# Patient Record
Sex: FEMALE | Race: WHITE | URBAN - METROPOLITAN AREA
[De-identification: names, ages, dates, MRNs, and addresses within clinical notes are randomized per-mention and may not be internally consistent; named-entity substitution may affect disease eponyms.]

---

## 2021-08-13 ENCOUNTER — APPOINTMENT (OUTPATIENT)
Dept: CT IMAGING | Age: 66
End: 2021-08-13
Payer: MEDICARE

## 2021-08-13 ENCOUNTER — APPOINTMENT (OUTPATIENT)
Dept: MRI IMAGING | Age: 66
End: 2021-08-13
Payer: MEDICARE

## 2021-08-13 ENCOUNTER — APPOINTMENT (OUTPATIENT)
Dept: GENERAL RADIOLOGY | Age: 66
End: 2021-08-13
Payer: MEDICARE

## 2021-08-13 ENCOUNTER — HOSPITAL ENCOUNTER (EMERGENCY)
Age: 66
Discharge: HOME | End: 2021-08-13
Attending: STUDENT IN AN ORGANIZED HEALTH CARE EDUCATION/TRAINING PROGRAM
Payer: MEDICARE

## 2021-08-13 VITALS
SYSTOLIC BLOOD PRESSURE: 143 MMHG | DIASTOLIC BLOOD PRESSURE: 89 MMHG | RESPIRATION RATE: 16 BRPM | HEART RATE: 61 BPM | HEIGHT: 72 IN | BODY MASS INDEX: 39.68 KG/M2 | WEIGHT: 293 LBS | TEMPERATURE: 98.5 F | OXYGEN SATURATION: 99 %

## 2021-08-13 DIAGNOSIS — M25.561 RIGHT KNEE PAIN, UNSPECIFIED CHRONICITY: ICD-10-CM
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
## 2021-08-13 DIAGNOSIS — M54.50 BILATERAL LOW BACK PAIN WITHOUT SCIATICA, UNSPECIFIED CHRONICITY: ICD-10-CM

## 2021-08-13 LAB
ANION GAP SERPL CALCULATED.3IONS-SCNC: 12 MMOL/L (ref 3–16)
ANION GAP SERPL CALCULATED.3IONS-SCNC: 8 MMOL/L (ref 3–16)
BASOPHILS ABSOLUTE: 0.1 K/UL (ref 0–0.2)
BASOPHILS RELATIVE PERCENT: 1.1 %
BILIRUBIN URINE: NEGATIVE
BLOOD, URINE: NEGATIVE
BUN BLDV-MCNC: 37 MG/DL (ref 7–20)
BUN BLDV-MCNC: 37 MG/DL (ref 7–20)
CALCIUM SERPL-MCNC: 8.9 MG/DL (ref 8.3–10.6)
CALCIUM SERPL-MCNC: 9.4 MG/DL (ref 8.3–10.6)
CHLORIDE BLD-SCNC: 101 MMOL/L (ref 99–110)
CHLORIDE BLD-SCNC: 103 MMOL/L (ref 99–110)
CLARITY: CLEAR
CO2: 21 MMOL/L (ref 21–32)
CO2: 23 MMOL/L (ref 21–32)
COLOR: YELLOW
CREAT SERPL-MCNC: 1.3 MG/DL (ref 0.6–1.2)
CREAT SERPL-MCNC: 1.3 MG/DL (ref 0.6–1.2)
EOSINOPHILS ABSOLUTE: 0.7 K/UL (ref 0–0.6)
EOSINOPHILS RELATIVE PERCENT: 8.5 %
GFR AFRICAN AMERICAN: 49
GFR AFRICAN AMERICAN: 49
GFR NON-AFRICAN AMERICAN: 41
GFR NON-AFRICAN AMERICAN: 41
GLUCOSE BLD-MCNC: 112 MG/DL (ref 70–99)
GLUCOSE BLD-MCNC: 98 MG/DL (ref 70–99)
GLUCOSE URINE: NEGATIVE MG/DL
HCT VFR BLD CALC: 35.5 % (ref 36–48)
HEMOGLOBIN: 11.7 G/DL (ref 12–16)
KETONES, URINE: NEGATIVE MG/DL
LEUKOCYTE ESTERASE, URINE: NEGATIVE
LYMPHOCYTES ABSOLUTE: 1.7 K/UL (ref 1–5.1)
LYMPHOCYTES RELATIVE PERCENT: 19.5 %
MCH RBC QN AUTO: 28.7 PG (ref 26–34)
MCHC RBC AUTO-ENTMCNC: 32.9 G/DL (ref 31–36)
MCV RBC AUTO: 87.3 FL (ref 80–100)
MICROSCOPIC EXAMINATION: NORMAL
MONOCYTES ABSOLUTE: 0.7 K/UL (ref 0–1.3)
MONOCYTES RELATIVE PERCENT: 8.4 %
NEUTROPHILS ABSOLUTE: 5.3 K/UL (ref 1.7–7.7)
NEUTROPHILS RELATIVE PERCENT: 62.5 %
NITRITE, URINE: NEGATIVE
PDW BLD-RTO: 14.5 % (ref 12.4–15.4)
PH UA: 6 (ref 5–8)
PLATELET # BLD: 307 K/UL (ref 135–450)
PMV BLD AUTO: 7.9 FL (ref 5–10.5)
POTASSIUM REFLEX MAGNESIUM: 5 MMOL/L (ref 3.5–5.1)
POTASSIUM SERPL-SCNC: 4.7 MMOL/L (ref 3.5–5.1)
PROTEIN UA: NEGATIVE MG/DL
RBC # BLD: 4.07 M/UL (ref 4–5.2)
SODIUM BLD-SCNC: 134 MMOL/L (ref 136–145)
SODIUM BLD-SCNC: 134 MMOL/L (ref 136–145)
SPECIFIC GRAVITY UA: 1.01 (ref 1–1.03)
URINE TYPE: NORMAL
UROBILINOGEN, URINE: 0.2 E.U./DL
WBC # BLD: 8.5 K/UL (ref 4–11)

## 2021-08-13 PROCEDURE — 70450 CT HEAD/BRAIN W/O DYE: CPT

## 2021-08-13 PROCEDURE — 96376 TX/PRO/DX INJ SAME DRUG ADON: CPT

## 2021-08-13 PROCEDURE — 96374 THER/PROPH/DIAG INJ IV PUSH: CPT

## 2021-08-13 PROCEDURE — 96375 TX/PRO/DX INJ NEW DRUG ADDON: CPT

## 2021-08-13 PROCEDURE — 81003 URINALYSIS AUTO W/O SCOPE: CPT

## 2021-08-13 PROCEDURE — 2580000003 HC RX 258: Performed by: NURSE PRACTITIONER

## 2021-08-13 PROCEDURE — 72146 MRI CHEST SPINE W/O DYE: CPT

## 2021-08-13 PROCEDURE — 72128 CT CHEST SPINE W/O DYE: CPT

## 2021-08-13 PROCEDURE — 6370000000 HC RX 637 (ALT 250 FOR IP): Performed by: PHYSICIAN ASSISTANT

## 2021-08-13 PROCEDURE — 73562 X-RAY EXAM OF KNEE 3: CPT

## 2021-08-13 PROCEDURE — 85025 COMPLETE CBC W/AUTO DIFF WBC: CPT

## 2021-08-13 PROCEDURE — 72131 CT LUMBAR SPINE W/O DYE: CPT

## 2021-08-13 PROCEDURE — 96361 HYDRATE IV INFUSION ADD-ON: CPT

## 2021-08-13 PROCEDURE — 72148 MRI LUMBAR SPINE W/O DYE: CPT

## 2021-08-13 PROCEDURE — 99285 EMERGENCY DEPT VISIT HI MDM: CPT

## 2021-08-13 PROCEDURE — 72125 CT NECK SPINE W/O DYE: CPT

## 2021-08-13 PROCEDURE — 6360000002 HC RX W HCPCS: Performed by: NURSE PRACTITIONER

## 2021-08-13 PROCEDURE — 80048 BASIC METABOLIC PNL TOTAL CA: CPT

## 2021-08-13 RX ORDER — OXYCODONE HYDROCHLORIDE 5 MG/1
10 TABLET ORAL ONCE
Status: COMPLETED | OUTPATIENT
Start: 2021-08-13 | End: 2021-08-13

## 2021-08-13 RX ORDER — SODIUM CHLORIDE, SODIUM LACTATE, POTASSIUM CHLORIDE, CALCIUM CHLORIDE 600; 310; 30; 20 MG/100ML; MG/100ML; MG/100ML; MG/100ML
1000 INJECTION, SOLUTION INTRAVENOUS ONCE
Status: COMPLETED | OUTPATIENT
Start: 2021-08-13 | End: 2021-08-13

## 2021-08-13 RX ORDER — OXYCODONE HYDROCHLORIDE 5 MG/1
5 TABLET ORAL EVERY 6 HOURS PRN
Qty: 12 TABLET | Refills: 0 | Status: SHIPPED | OUTPATIENT
Start: 2021-08-13 | End: 2021-08-16

## 2021-08-13 RX ORDER — ACETAMINOPHEN 325 MG/1
650 TABLET ORAL EVERY 6 HOURS PRN
Qty: 60 TABLET | Refills: 0 | Status: SHIPPED | OUTPATIENT
Start: 2021-08-13

## 2021-08-13 RX ORDER — ONDANSETRON 2 MG/ML
4 INJECTION INTRAMUSCULAR; INTRAVENOUS ONCE
Status: COMPLETED | OUTPATIENT
Start: 2021-08-13 | End: 2021-08-13

## 2021-08-13 RX ADMIN — HYDROMORPHONE HYDROCHLORIDE 1 MG: 1 INJECTION, SOLUTION INTRAMUSCULAR; INTRAVENOUS; SUBCUTANEOUS at 15:12

## 2021-08-13 RX ADMIN — HYDROMORPHONE HYDROCHLORIDE 1 MG: 1 INJECTION, SOLUTION INTRAMUSCULAR; INTRAVENOUS; SUBCUTANEOUS at 13:07

## 2021-08-13 RX ADMIN — OXYCODONE 10 MG: 5 TABLET ORAL at 19:15

## 2021-08-13 RX ADMIN — SODIUM CHLORIDE, POTASSIUM CHLORIDE, SODIUM LACTATE AND CALCIUM CHLORIDE 1000 ML: 600; 310; 30; 20 INJECTION, SOLUTION INTRAVENOUS at 15:07

## 2021-08-13 RX ADMIN — ONDANSETRON 4 MG: 2 INJECTION INTRAMUSCULAR; INTRAVENOUS at 13:08

## 2021-08-13 ASSESSMENT — ENCOUNTER SYMPTOMS
VOMITING: 0
ABDOMINAL PAIN: 0
NAUSEA: 0
RESPIRATORY NEGATIVE: 1
BACK PAIN: 1
EYES NEGATIVE: 1

## 2021-08-13 ASSESSMENT — PAIN SCALES - GENERAL
PAINLEVEL_OUTOF10: 8
PAINLEVEL_OUTOF10: 5
PAINLEVEL_OUTOF10: 4
PAINLEVEL_OUTOF10: 4
PAINLEVEL_OUTOF10: 5
PAINLEVEL_OUTOF10: 4

## 2021-08-13 ASSESSMENT — PAIN DESCRIPTION - LOCATION: LOCATION: HEAD

## 2021-08-13 ASSESSMENT — PAIN DESCRIPTION - PAIN TYPE: TYPE: ACUTE PAIN

## 2021-08-13 ASSESSMENT — PAIN DESCRIPTION - ORIENTATION: ORIENTATION: POSTERIOR

## 2021-08-13 NOTE — ED PROVIDER NOTES
[address] [facility] [address] ENCOUNTER          PHYSICIAN ASSISTANT NOTE     Date of evaluation: 8/13/2021    ADDENDUM:      Care of this patient was assumed from Anay Martins NP. The patient was seen for Fall (PT STATES HER LEGS \"JUST GAVE OUT\" AND SHE FELL GRASSY AREA), Back Pain (PT HAD SPINAL FUSION IN APRIL 2021 ), and Headache (PT STATES HE HIT HER HEAD ON GRASS WHEN SHE FELL, DENIES LOC BUT HAVING POSTERIOR HEAD PAN AND TAKE 81MG ASA WHICH SHE TOOK THIS AM )  . The patient's initial evaluation and plan have been discussed with the prior provider who initially evaluated the patient. Nursing Notes, Past Medical Hx, Past Surgical Hx, Social Hx, Allergies, and Family Hx were all reviewed. The patient was turned over into my care pending MRIs of the thoracic and lumbar spine as well as discussion with her surgeon at [address] [place] [address]. In summary this patient had surgery in April in which she has had a fusion from T9 through the sacrum. Today she was walking with her walker and states her legs gave out and she fell. She presented with worsening pain. She had some intermittent complaints of decreased sensation from the knee down with 3 out of 5 strength of the left lower extremity. Discussion with the patient she states she has had some decreased sensation that has progressively worsened since her surgery but she has not seen her surgeon for this. Diagnostic Results         RADIOLOGY:  CT LUMBAR SPINE WO CONTRAST   Final Result   1. Thoracic fusion hardware and screw alignment: Normal. No central canal or foraminal stenosis   2. L5-S1 right foraminal and subarticular stenosis   3. Suspect right foraminal stenosis and subarticular narrowing at L4-L5   4. Postoperative laminectomy with bony chip fragments noted right subarticular space, subarticular impingement not excluded. CT THORACIC SPINE WO CONTRAST   Final Result   1.  Thoracic fusion hardware and screw alignment: Normal. No central canal or foraminal stenosis   2. L5-S1 right foraminal and subarticular stenosis   3. Suspect right foraminal stenosis and subarticular narrowing at L4-L5   4. Postoperative laminectomy with bony chip fragments noted right subarticular space, subarticular impingement not excluded. XR KNEE RIGHT (3 VIEWS)   Final Result   1. Intact total knee prosthesis   2. Normal soft tissues      MRI THORACIC SPINE WO CONTRAST   Final Result   1. Normal thoracic cord. No cord compression Intact hardware     2. Degenerative Modic changes T9-T10   3. Limited visualization of lumbar central canal from L1 through L4   4. Postoperative subcutis fluid collection abutting the posterior spinous process, postoperative seroma measuring 11 x 3.7 x 2.3 cm. MRI LUMBAR SPINE WO CONTRAST   Final Result   1. Normal thoracic cord. No cord compression Intact hardware     2. Degenerative Modic changes T9-T10   3. Limited visualization of lumbar central canal from L1 through L4   4. Postoperative subcutis fluid collection abutting the posterior spinous process, postoperative seroma measuring 11 x 3.7 x 2.3 cm. CT HEAD WO CONTRAST   Final Result      1. NO ACUTE INTRACRANIAL ABNORMALITY. CT CERVICAL SPINE WO CONTRAST   Final Result   1. No acute traumatic abnormality of the cervical spine.              LABS:   Results for orders placed or performed during the hospital encounter of 08/13/21   CBC auto differential   Result Value Ref Range    WBC 8.5 4.0 - 11.0 K/uL    RBC 4.07 4.00 - 5.20 M/uL    Hemoglobin 11.7 (L) 12.0 - 16.0 g/dL    Hematocrit 35.5 (L) 36.0 - 48.0 %    MCV 87.3 80.0 - 100.0 fL    MCH 28.7 26.0 - 34.0 pg    MCHC 32.9 31.0 - 36.0 g/dL    RDW 14.5 12.4 - 15.4 %    Platelets 146 265 - 123 K/uL    MPV 7.9 5.0 - 10.5 fL    Neutrophils % 62.5 %    Lymphocytes % 19.5 %    Monocytes % 8.4 %    Eosinophils % 8.5 %    Basophils % 1.1 %    Neutrophils Absolute 5.3 1.7 - 7.7 K/uL    Lymphocytes Absolute 1.7 1.0 - 5.1 K/uL    Monocytes Absolute 0.7 0.0 - 1.3 K/uL    Eosinophils Absolute 0.7 (H) 0.0 - 0.6 K/uL    Basophils Absolute 0.1 0.0 - 0.2 K/uL   Basic Metabolic Panel   Result Value Ref Range    Sodium 134 (L) 136 - 145 mmol/L    Potassium 4.7 3.5 - 5.1 mmol/L    Chloride 101 99 - 110 mmol/L    CO2 21 21 - 32 mmol/L    Anion Gap 12 3 - 16    Glucose 112 (H) 70 - 99 mg/dL    BUN 37 (H) 7 - 20 mg/dL    CREATININE 1.3 (H) 0.6 - 1.2 mg/dL    GFR Non- 41 (A) >60    GFR  49 (A) >60    Calcium 9.4 8.3 - 10.6 mg/dL   Urinalysis, reflex to microscopic (Lab)   Result Value Ref Range    Color, UA Yellow Straw/Yellow    Clarity, UA Clear Clear    Glucose, Ur Negative Negative mg/dL    Bilirubin Urine Negative Negative    Ketones, Urine Negative Negative mg/dL    Specific Gravity, UA 1.010 1.005 - 1.030    Blood, Urine Negative Negative    pH, UA 6.0 5.0 - 8.0    Protein, UA Negative Negative mg/dL    Urobilinogen, Urine 0.2 <2.0 E.U./dL    Nitrite, Urine Negative Negative    Leukocyte Esterase, Urine Negative Negative    Microscopic Examination Not Indicated     Urine Type NotGiven    Basic Metabolic Panel w/ Reflex to MG   Result Value Ref Range    Sodium 134 (L) 136 - 145 mmol/L    Potassium reflex Magnesium 5.0 3.5 - 5.1 mmol/L    Chloride 103 99 - 110 mmol/L    CO2 23 21 - 32 mmol/L    Anion Gap 8 3 - 16    Glucose 98 70 - 99 mg/dL    BUN 37 (H) 7 - 20 mg/dL    CREATININE 1.3 (H) 0.6 - 1.2 mg/dL    GFR Non- 41 (A) >60    GFR  49 (A) >60    Calcium 8.9 8.3 - 10.6 mg/dL       RECENT VITALS:  BP: (!) 139/55, Temp: 98.5 °F (36.9 °C), Pulse: 68, Resp: 16, SpO2: 97 %     Procedures         ED Course     The patient was given the following medications:  Orders Placed This Encounter   Medications    HYDROmorphone (DILAUDID) injection 1 mg    ondansetron (ZOFRAN) injection 4 mg    lactated ringers infusion 1,000 mL    HYDROmorphone (DILAUDID) injection 1 mg    oxyCODONE (ROXICODONE) immediate release tablet 10 mg    oxyCODONE (ROXICODONE) 5 MG immediate release tablet     Sig: Take 1 tablet by mouth every 6 hours as needed for Pain for up to 3 days. WARNING: May cause drowsiness. May impair ability to operate a motor vehicle or machinery. Do not use in combination with alcohol. Dispense:  12 tablet     Refill:  0    acetaminophen (TYLENOL) 325 MG tablet     Sig: Take 2 tablets by mouth every 6 hours as needed for Pain     Dispense:  60 tablet     Refill:  0       CONSULTS:  IP CONSULT TO [address] [place] [address] / ASSESSMENT / Doc Bolus is a 76 y.o. female who presented to the emergency department after a fall causing worsening back pain after the patient had a fusion from T9 to the sacrum in April. Upon turned over into my care we were awaiting the results of her MRI. MRI showed normal thoracic cord with no cord compression and intact hardware. Degenerative Modic changes seen at T9-T10. There is limited visualization of the lumbar central canal from L1-L4. Also noted was postoperative subcutis fluid collection abutting the posterior spinous process consistent with a postoperative seroma. I spoke with the patient's neurosurgeon Dr. Kaia Catherine. He stated these are post op changes. He did recommend a CT of the thoracic and lumbar spine to better assess the hardware since the fall. She also had some right knee pain therefore I ordered an x-ray of her right knee. She was given 2 oxycodone here which she tolerated well. X-ray of the right knee shows intact total knee prosthesis with normal soft tissues. CT of the thoracic and lumbar spine shows thoracic fusion hardware and screw alignment which is normal with no central canal or foraminal stenosis. She has L5-S1 right foraminal and subarticular stenosis noted.   This shows suspect right foraminal stenosis and subarticular narrowing at L4/L5 with postoperative laminectomy bony chip fragments noted in the right subarticular space. I discussed the results of her CT scans with her neurosurgeon. He states this is nothing requiring emergent treatment and the hardware all is intact. While here the patient was able to ambulate using her walker with some mild pain however with a steady gait. In discussion with the patient and her daughter she feels comfortable to go home to follow-up with her neurosurgeon on Tuesday as scheduled. The patient has a scription for Percocet at home however I did offered to split up the oxycodone and Tylenol so that she is able to take something throughout the day. She is to discard the Percocet and begin the Tylenol I prescribed as well as the oxycodone for breakthrough pain. She is to return to the emergency department for worsening symptoms or concerns as discussed. This patient was also evaluated by the attending physician. All care plans were discussed and agreed upon. Clinical Impression     1. Fall, initial encounter    2. Bilateral low back pain without sciatica, unspecified chronicity    3. Right knee pain, unspecified chronicity        Disposition     PATIENT REFERRED TO:  Misty Victoria MD  [address] [address] [address] [place] [address]  [phone]      on Tuesday as scheduled      DISCHARGE MEDICATIONS:  New Prescriptions    ACETAMINOPHEN (TYLENOL) 325 MG TABLET    Take 2 tablets by mouth every 6 hours as needed for Pain    OXYCODONE (ROXICODONE) 5 MG IMMEDIATE RELEASE TABLET    Take 1 tablet by mouth every 6 hours as needed for Pain for up to 3 days. WARNING: May cause drowsiness. May impair ability to operate a motor vehicle or machinery. Do not use in combination with alcohol.        DISPOSITION Decision To Discharge 08/13/2021 10:24:41 PM       Kitty Craig  08/14/21 0101

## 2021-08-13 NOTE — ED PROVIDER NOTES
[address] [facility]  EMERGENCY DEPARTMENT ENCOUNTER          NURSE PRACTITIONER NOTE       Date of evaluation: 8/13/2021    Chief Complaint     Fall (PT STATES HER LEGS \"JUST GAVE OUT\" AND SHE FELL GRASSY AREA), Back Pain (PT HAD SPINAL FUSION IN APRIL 2021 ), and Headache (PT STATES HE HIT HER HEAD ON GRASS WHEN SHE FELL, DENIES LOC BUT HAVING POSTERIOR HEAD PAN AND TAKE 81MG ASA WHICH SHE TOOK THIS AM )      History of Present Illness     Griselda Burdick is a 76 y.o. female with a past medical history as noted below including recent L1-4 fusion and T9-pelvis fusion with Dr. Darell Galindo in April, 2021; who presents to the ED with a complaint of a fall. Patient states she was taking her daily walk with her walker outside that she has been doing since her spinal fusion in April 2021, states that today it felt like her legs gave out on her, cannot quantify if it was her right or her left leg; and she fell into the grass. States she did hit her head, did not lose consciousness. Had worsening back pain after this, complains of posterior head pain, and continued numbness and tingling of her left lower extremity. Patient cannot tell me how long the left lower extremity has been numb, so unsure if this is new or not. She does note that it was numb prior to her fall, has not followed up with her neurosurgeon/Dr. Darell Galindo regarding this numbness. Patient also shares that she has had \"clear drainage\" in her genital region that has caused a rash of her inner groin/redness and irritation of her inner groin. She has been treated antibiotics and now Diflucan by her PCP; however states that she has not been evaluated for this drainage. She was told to follow-up with gynecology she does not see until September. Patient unsure if this is urine, or drainage coming from her vagina. She denies any associated pain with this. States she does urinate normally, and denies any urinary symptoms. Denies any bowel incontinence. Prior to her fall today was having her normal back pain and otherwise states she was at her baseline. Review of Systems     Review of Systems   Constitutional: Negative. Eyes: Negative. Respiratory: Negative. Cardiovascular: Negative. Gastrointestinal: Negative for abdominal pain, nausea and vomiting. Genitourinary: Negative for dysuria, flank pain, frequency, hematuria, pelvic pain and urgency. \"clear drainage\"    Musculoskeletal: Positive for back pain, gait problem and neck pain. Skin: Negative. Allergic/Immunologic: Negative for immunocompromised state. Neurological: Positive for weakness (lower extremities) and numbness (left lower extremity). Hematological: Does not bruise/bleed easily. Psychiatric/Behavioral: Negative. Past Medical, Surgical, Family, and Social History     She has a past medical history of HTN (hypertension), Hyperlipidemia, Osteoarthritis, and Type II or unspecified type diabetes mellitus without mention of complication, not stated as uncontrolled. She has a past surgical history that includes joint replacement (4/03,05,07); Lumbar disc surgery; lumbar laminectomy; and Cholecystectomy. Her family history includes Heart Disease in her mother; High Blood Pressure in her mother; Other in her father. She reports that she has quit smoking. She quit after 25.00 years of use. She has never used smokeless tobacco. She reports previous alcohol use of about 1.0 standard drinks of alcohol per week. She reports that she does not use drugs. Medications     Previous Medications    AZITHROMYCIN (ZITHROMAX) 250 MG TABLET    Take 2 tabs (500 mg) on Day 1, and take 1 tab (250 mg) on days 2 through 5. DIPHENHYDRAMINE-APAP, SLEEP, (TYLENOL PM EXTRA STRENGTH)  MG TABS    Take  by mouth.       LISINOPRIL-HYDROCHLOROTHIAZIDE (PRINZIDE;ZESTORETIC) 10-12.5 MG PER TABLET    TAKE ONE TABLET BY MOUTH EVERY DAY    LISINOPRIL-HYDROCHLOROTHIAZIDE (PRINZIDE;ZESTORETIC) 10-12.5 MG PER TABLET    TAKE ONE TABLET BY MOUTH EVERY DAY    METFORMIN (GLUCOPHAGE) 500 MG TABLET    TAKE TWO TABLETS BY MOUTH TWICE DAILY WITH MEALS. MULTIPLE VITAMIN (MULTI-VITAMIN) TABS    Take  by mouth. NAPROXEN SODIUM (ALEVE) 220 MG TABLET    Take 220 mg by mouth 2 times daily (with meals). ONE TOUCH CLUB LANCETS MISC    One Touch Ultra 2  Test 2-3 times daily    ONE TOUCH ULTRA TEST STRIP    USE AS DIRECTED TO TEST 2-3 TIMES PER DAY       Allergies     She is allergic to pcn [penicillins]. Physical Exam     INITIAL VITALS: BP: (!) 163/72, Temp: 98.5 °F (36.9 °C), Pulse: 87, Resp: 16, SpO2: 98 %   Physical Exam  Vitals and nursing note reviewed. Constitutional:       General: She is in acute distress. Appearance: She is obese. Comments: Appears to be in pain, lying supine on gurney   Neck:      Comments: Tenderness palpation of C-spine midline  Cardiovascular:      Rate and Rhythm: Normal rate and regular rhythm. Pulses: Normal pulses. Heart sounds: Normal heart sounds. Pulmonary:      Effort: Pulmonary effort is normal. No respiratory distress. Breath sounds: Normal breath sounds. Abdominal:      General: Bowel sounds are normal. There is no distension. Tenderness: There is abdominal tenderness (Left lower quadrant). Musculoskeletal:      Comments: TTP of the c-spine, t-spine and l-spine midline and paraspinal musculature, post op scaring noted along lower thoracic and lumbar spine; no crepitus or deformity noted. Decreased sensation and strength in the left lower extremity. Skin:     General: Skin is warm and dry. Neurological:      Mental Status: She is alert and oriented to person, place, and time. Comments: Decreased sensation to sharp and dull sensation in the left lower extremity from the knee distally; strength 3-4/5 in the LLE with decreased dorsiflexion of the foot.   RLE 5/5, intact sensation and intact distal pulses. Psychiatric:         Mood and Affect: Mood normal.         Behavior: Behavior normal.           Diagnostic Results     RADIOLOGY:  CT HEAD WO CONTRAST   Final Result      1. NO ACUTE INTRACRANIAL ABNORMALITY. CT CERVICAL SPINE WO CONTRAST   Final Result   1. No acute traumatic abnormality of the cervical spine.          MRI THORACIC SPINE WO CONTRAST    (Results Pending)   MRI LUMBAR SPINE WO CONTRAST    (Results Pending)       LABS:   Results for orders placed or performed during the hospital encounter of 08/13/21   CBC auto differential   Result Value Ref Range    WBC 8.5 4.0 - 11.0 K/uL    RBC 4.07 4.00 - 5.20 M/uL    Hemoglobin 11.7 (L) 12.0 - 16.0 g/dL    Hematocrit 35.5 (L) 36.0 - 48.0 %    MCV 87.3 80.0 - 100.0 fL    MCH 28.7 26.0 - 34.0 pg    MCHC 32.9 31.0 - 36.0 g/dL    RDW 14.5 12.4 - 15.4 %    Platelets 421 348 - 728 K/uL    MPV 7.9 5.0 - 10.5 fL    Neutrophils % 62.5 %    Lymphocytes % 19.5 %    Monocytes % 8.4 %    Eosinophils % 8.5 %    Basophils % 1.1 %    Neutrophils Absolute 5.3 1.7 - 7.7 K/uL    Lymphocytes Absolute 1.7 1.0 - 5.1 K/uL    Monocytes Absolute 0.7 0.0 - 1.3 K/uL    Eosinophils Absolute 0.7 (H) 0.0 - 0.6 K/uL    Basophils Absolute 0.1 0.0 - 0.2 K/uL   Basic Metabolic Panel   Result Value Ref Range    Sodium 134 (L) 136 - 145 mmol/L    Potassium 4.7 3.5 - 5.1 mmol/L    Chloride 101 99 - 110 mmol/L    CO2 21 21 - 32 mmol/L    Anion Gap 12 3 - 16    Glucose 112 (H) 70 - 99 mg/dL    BUN 37 (H) 7 - 20 mg/dL    CREATININE 1.3 (H) 0.6 - 1.2 mg/dL    GFR Non- 41 (A) >60    GFR  49 (A) >60    Calcium 9.4 8.3 - 10.6 mg/dL   Urinalysis, reflex to microscopic (Lab)   Result Value Ref Range    Color, UA Yellow Straw/Yellow    Clarity, UA Clear Clear    Glucose, Ur Negative Negative mg/dL    Bilirubin Urine Negative Negative    Ketones, Urine Negative Negative mg/dL    Specific Gravity, UA 1.010 1.005 - 1.030    Blood, Urine Negative Negative pH, UA 6.0 5.0 - 8.0    Protein, UA Negative Negative mg/dL    Urobilinogen, Urine 0.2 <2.0 E.U./dL    Nitrite, Urine Negative Negative    Leukocyte Esterase, Urine Negative Negative    Microscopic Examination Not Indicated     Urine Type NotGiven        RECENT VITALS:  BP: (!) 151/56, Temp: 98.5 °F (36.9 °C), Pulse: 64, Resp: 16, SpO2: 98 %       ED Course     Nursing Notes, Past Medical Hx, Past Surgical Hx, Social Hx, Allergies, and Family Hx were reviewed. The patient was given the following medications:  Orders Placed This Encounter   Medications    HYDROmorphone (DILAUDID) injection 1 mg    ondansetron (ZOFRAN) injection 4 mg    lactated ringers infusion 1,000 mL    HYDROmorphone (DILAUDID) injection 1 mg            CONSULTS:  None    MEDICAL DECISION MAKING / ASSESSMENT / Jazmin Hill is a 76 y.o. female who presents with complaints as noted in HPI. Patient presents to the emergency department with a complaint of a fall prior to arrival landing in the grass with associated back pain, neck pain, headache. Patient with a recent spinal fusion, during examination is noted that she has decreased sensation and decreased strength in her left lower extremity, she does tell me that she has had some ongoing numbness and tingling in this leg cannot tell me if it is worse today or not, states \"well I have not felt it. \"  Has decreased dorsiflexion as well. Patient also has been having clear drainage concerning for possible urinary incontinence although does also urinate independently, no bowel incontinence. Given her weakness, and decreased sensation, concern for possible acute spinal abnormality.   Head CT, C-spine CT were obtained which are notably unremarkable for acute abnormality; and an MRI of the thoracic and lumbar spine were ordered for further evaluation of weakness and decreased sensation   Screening labs including CBC, EP 1, urinalysis were obtained; CBC without leukocytosis noted, stable H&H. Urinalysis without evidence of infection. BMP with a slight increase of creatinine to 1.3 from a baseline of 0.9, and BUN of 37. Patient given a liter of LR. No ketonuria noted. Patient was turned over primarily to my colleague for follow-up on MRI imaging results, discussion with neurosurgery, and ultimate disposition. This patient was also evaluated by the attending physician. All care plans were discussed and agreed upon. Clinical Impression     1. Fall, initial encounter    2. Bilateral low back pain without sciatica, unspecified chronicity    3.  Right knee pain, unspecified chronicity        Disposition       DISPOSITION pending        NIKKI Lawler - CNP  08/16/21 6715

## 2021-08-13 NOTE — ED NOTES
MRI check list completed      Mirian Mansfeild, [facility]  08/13/21 [id number]

## 2021-08-13 NOTE — ED NOTES
Bed: B24-24  Expected date:   Expected time:   Means of arrival:   Comments:  solange N[place] RN  08/13/21 0637

## 2021-08-19 ENCOUNTER — APPOINTMENT (OUTPATIENT)
Dept: CT IMAGING | Age: 66
DRG: 872 | End: 2021-08-19
Payer: MEDICARE

## 2021-08-19 ENCOUNTER — HOSPITAL ENCOUNTER (INPATIENT)
Age: 66
LOS: 4 days | Discharge: HOME | DRG: 872 | End: 2021-08-23
Attending: EMERGENCY MEDICINE | Admitting: INTERNAL MEDICINE
Payer: MEDICARE

## 2021-08-19 ENCOUNTER — APPOINTMENT (OUTPATIENT)
Dept: GENERAL RADIOLOGY | Age: 66
DRG: 872 | End: 2021-08-19
Payer: MEDICARE

## 2021-08-19 DIAGNOSIS — N17.9 AKI (ACUTE KIDNEY INJURY) (HCC): ICD-10-CM

## 2021-08-19 DIAGNOSIS — R50.9 FEVER, UNSPECIFIED FEVER CAUSE: Primary | ICD-10-CM

## 2021-08-19 DIAGNOSIS — D72.829 LEUKOCYTOSIS, UNSPECIFIED TYPE: ICD-10-CM

## 2021-08-19 PROBLEM — A41.9 SEPSIS (HCC): Status: ACTIVE | Noted: 2021-08-19

## 2021-08-19 LAB
ALBUMIN SERPL-MCNC: 3.6 G/DL (ref 3.4–5)
ALP BLD-CCNC: 109 U/L (ref 40–129)
ALT SERPL-CCNC: 13 U/L (ref 10–40)
ANION GAP SERPL CALCULATED.3IONS-SCNC: 16 MMOL/L (ref 3–16)
AST SERPL-CCNC: 26 U/L (ref 15–37)
BACTERIA: ABNORMAL /HPF
BASE EXCESS VENOUS: -2.1 MMOL/L (ref -2–3)
BASOPHILS ABSOLUTE: 0.1 K/UL (ref 0–0.2)
BASOPHILS RELATIVE PERCENT: 0.3 %
BILIRUB SERPL-MCNC: 0.7 MG/DL (ref 0–1)
BILIRUBIN DIRECT: <0.2 MG/DL (ref 0–0.3)
BILIRUBIN URINE: NEGATIVE
BILIRUBIN, INDIRECT: NORMAL MG/DL (ref 0–1)
BLOOD, URINE: ABNORMAL
BUN BLDV-MCNC: 30 MG/DL (ref 7–20)
CALCIUM SERPL-MCNC: 9 MG/DL (ref 8.3–10.6)
CARBOXYHEMOGLOBIN: 1.2 % (ref 0–1.5)
CHLORIDE BLD-SCNC: 98 MMOL/L (ref 99–110)
CLARITY: CLEAR
CO2: 17 MMOL/L (ref 21–32)
COLOR: YELLOW
CREAT SERPL-MCNC: 1.4 MG/DL (ref 0.6–1.2)
EOSINOPHILS ABSOLUTE: 0.1 K/UL (ref 0–0.6)
EOSINOPHILS RELATIVE PERCENT: 0.4 %
EPITHELIAL CELLS, UA: ABNORMAL /HPF (ref 0–5)
GFR AFRICAN AMERICAN: 45
GFR NON-AFRICAN AMERICAN: 38
GLUCOSE BLD-MCNC: 132 MG/DL (ref 70–99)
GLUCOSE BLD-MCNC: 147 MG/DL (ref 70–99)
GLUCOSE URINE: NEGATIVE MG/DL
HCO3 VENOUS: 21.7 MMOL/L (ref 24–28)
HCT VFR BLD CALC: 34.4 % (ref 36–48)
HEMOGLOBIN, VEN, REDUCED: 18.7 %
HEMOGLOBIN: 11.5 G/DL (ref 12–16)
KETONES, URINE: NEGATIVE MG/DL
LACTIC ACID, SEPSIS: 2.4 MMOL/L (ref 0.4–1.9)
LACTIC ACID: 1.7 MMOL/L (ref 0.4–2)
LEUKOCYTE ESTERASE, URINE: ABNORMAL
LYMPHOCYTES ABSOLUTE: 0.4 K/UL (ref 1–5.1)
LYMPHOCYTES RELATIVE PERCENT: 2.1 %
MCH RBC QN AUTO: 28.7 PG (ref 26–34)
MCHC RBC AUTO-ENTMCNC: 33.4 G/DL (ref 31–36)
MCV RBC AUTO: 86 FL (ref 80–100)
METHEMOGLOBIN VENOUS: 0.1 % (ref 0–1.5)
MICROSCOPIC EXAMINATION: YES
MONOCYTES ABSOLUTE: 1 K/UL (ref 0–1.3)
MONOCYTES RELATIVE PERCENT: 5.1 %
NEUTROPHILS ABSOLUTE: 18 K/UL (ref 1.7–7.7)
NEUTROPHILS RELATIVE PERCENT: 92.1 %
NITRITE, URINE: POSITIVE
O2 SAT, VEN: 81 %
PCO2, VEN: 33.5 MMHG (ref 41–51)
PDW BLD-RTO: 14.5 % (ref 12.4–15.4)
PERFORMED ON: ABNORMAL
PH UA: 6 (ref 5–8)
PH VENOUS: 7.42 (ref 7.35–7.45)
PLATELET # BLD: 290 K/UL (ref 135–450)
PMV BLD AUTO: 7.9 FL (ref 5–10.5)
PO2, VEN: 44.5 MMHG (ref 25–40)
POTASSIUM SERPL-SCNC: 4.7 MMOL/L (ref 3.5–5.1)
PRO-BNP: 1058 PG/ML (ref 0–124)
PROTEIN UA: 30 MG/DL
RAPID INFLUENZA  B AGN: NEGATIVE
RAPID INFLUENZA A AGN: NEGATIVE
RBC # BLD: 4 M/UL (ref 4–5.2)
RBC UA: ABNORMAL /HPF (ref 0–4)
SODIUM BLD-SCNC: 131 MMOL/L (ref 136–145)
SPECIFIC GRAVITY UA: 1.01 (ref 1–1.03)
TCO2 CALC VENOUS: 23 MMOL/L
TOTAL PROTEIN: 7.6 G/DL (ref 6.4–8.2)
TROPONIN: <0.01 NG/ML
URINE TYPE: ABNORMAL
UROBILINOGEN, URINE: 0.2 E.U./DL
WBC # BLD: 19.5 K/UL (ref 4–11)
WBC UA: ABNORMAL /HPF (ref 0–5)

## 2021-08-19 PROCEDURE — 83880 ASSAY OF NATRIURETIC PEPTIDE: CPT

## 2021-08-19 PROCEDURE — 96374 THER/PROPH/DIAG INJ IV PUSH: CPT

## 2021-08-19 PROCEDURE — 80076 HEPATIC FUNCTION PANEL: CPT

## 2021-08-19 PROCEDURE — 6360000004 HC RX CONTRAST MEDICATION: Performed by: STUDENT IN AN ORGANIZED HEALTH CARE EDUCATION/TRAINING PROGRAM

## 2021-08-19 PROCEDURE — U0005 INFEC AGEN DETEC AMPLI PROBE: HCPCS

## 2021-08-19 PROCEDURE — 36415 COLL VENOUS BLD VENIPUNCTURE: CPT

## 2021-08-19 PROCEDURE — 74177 CT ABD & PELVIS W/CONTRAST: CPT

## 2021-08-19 PROCEDURE — 84484 ASSAY OF TROPONIN QUANT: CPT

## 2021-08-19 PROCEDURE — 82803 BLOOD GASES ANY COMBINATION: CPT

## 2021-08-19 PROCEDURE — 87186 SC STD MICRODIL/AGAR DIL: CPT

## 2021-08-19 PROCEDURE — 1200000000 HC SEMI PRIVATE

## 2021-08-19 PROCEDURE — 87086 URINE CULTURE/COLONY COUNT: CPT

## 2021-08-19 PROCEDURE — 80048 BASIC METABOLIC PNL TOTAL CA: CPT

## 2021-08-19 PROCEDURE — 87150 DNA/RNA AMPLIFIED PROBE: CPT

## 2021-08-19 PROCEDURE — 6360000002 HC RX W HCPCS: Performed by: STUDENT IN AN ORGANIZED HEALTH CARE EDUCATION/TRAINING PROGRAM

## 2021-08-19 PROCEDURE — 93005 ELECTROCARDIOGRAM TRACING: CPT

## 2021-08-19 PROCEDURE — 83605 ASSAY OF LACTIC ACID: CPT

## 2021-08-19 PROCEDURE — U0003 INFECTIOUS AGENT DETECTION BY NUCLEIC ACID (DNA OR RNA); SEVERE ACUTE RESPIRATORY SYNDROME CORONAVIRUS 2 (SARS-COV-2) (CORONAVIRUS DISEASE [COVID-19]), AMPLIFIED PROBE TECHNIQUE, MAKING USE OF HIGH THROUGHPUT TECHNOLOGIES AS DESCRIBED BY CMS-2020-01-R: HCPCS

## 2021-08-19 PROCEDURE — 6370000000 HC RX 637 (ALT 250 FOR IP): Performed by: INTERNAL MEDICINE

## 2021-08-19 PROCEDURE — 2580000003 HC RX 258: Performed by: INTERNAL MEDICINE

## 2021-08-19 PROCEDURE — 87088 URINE BACTERIA CULTURE: CPT

## 2021-08-19 PROCEDURE — 85025 COMPLETE CBC W/AUTO DIFF WBC: CPT

## 2021-08-19 PROCEDURE — 87040 BLOOD CULTURE FOR BACTERIA: CPT

## 2021-08-19 PROCEDURE — 71045 X-RAY EXAM CHEST 1 VIEW: CPT

## 2021-08-19 PROCEDURE — 2580000003 HC RX 258: Performed by: STUDENT IN AN ORGANIZED HEALTH CARE EDUCATION/TRAINING PROGRAM

## 2021-08-19 PROCEDURE — 6360000002 HC RX W HCPCS: Performed by: INTERNAL MEDICINE

## 2021-08-19 PROCEDURE — 87804 INFLUENZA ASSAY W/OPTIC: CPT

## 2021-08-19 PROCEDURE — 81001 URINALYSIS AUTO W/SCOPE: CPT

## 2021-08-19 PROCEDURE — 6370000000 HC RX 637 (ALT 250 FOR IP): Performed by: STUDENT IN AN ORGANIZED HEALTH CARE EDUCATION/TRAINING PROGRAM

## 2021-08-19 PROCEDURE — 99285 EMERGENCY DEPT VISIT HI MDM: CPT

## 2021-08-19 RX ORDER — INSULIN LISPRO 100 [IU]/ML
0-6 INJECTION, SOLUTION INTRAVENOUS; SUBCUTANEOUS
Status: DISCONTINUED | OUTPATIENT
Start: 2021-08-20 | End: 2021-08-23 | Stop reason: HOSPADM

## 2021-08-19 RX ORDER — ONDANSETRON 2 MG/ML
4 INJECTION INTRAMUSCULAR; INTRAVENOUS EVERY 6 HOURS PRN
Status: DISCONTINUED | OUTPATIENT
Start: 2021-08-19 | End: 2021-08-23 | Stop reason: HOSPADM

## 2021-08-19 RX ORDER — INSULIN GLARGINE 100 [IU]/ML
40 INJECTION, SOLUTION SUBCUTANEOUS NIGHTLY
COMMUNITY

## 2021-08-19 RX ORDER — FUROSEMIDE 40 MG/1
20 TABLET ORAL DAILY
Status: ON HOLD | COMMUNITY
End: 2021-08-23 | Stop reason: SDUPTHER

## 2021-08-19 RX ORDER — ONDANSETRON 4 MG/1
4 TABLET, ORALLY DISINTEGRATING ORAL EVERY 8 HOURS PRN
Status: DISCONTINUED | OUTPATIENT
Start: 2021-08-19 | End: 2021-08-23 | Stop reason: HOSPADM

## 2021-08-19 RX ORDER — POLYETHYLENE GLYCOL 3350 17 G/17G
17 POWDER, FOR SOLUTION ORAL DAILY PRN
Status: DISCONTINUED | OUTPATIENT
Start: 2021-08-19 | End: 2021-08-23 | Stop reason: HOSPADM

## 2021-08-19 RX ORDER — SODIUM CHLORIDE 0.9 % (FLUSH) 0.9 %
5-40 SYRINGE (ML) INJECTION PRN
Status: DISCONTINUED | OUTPATIENT
Start: 2021-08-19 | End: 2021-08-23 | Stop reason: HOSPADM

## 2021-08-19 RX ORDER — ACETAMINOPHEN 650 MG/1
650 SUPPOSITORY RECTAL EVERY 6 HOURS PRN
Status: DISCONTINUED | OUTPATIENT
Start: 2021-08-19 | End: 2021-08-23 | Stop reason: HOSPADM

## 2021-08-19 RX ORDER — INSULIN LISPRO 100 [IU]/ML
0-3 INJECTION, SOLUTION INTRAVENOUS; SUBCUTANEOUS NIGHTLY
Status: DISCONTINUED | OUTPATIENT
Start: 2021-08-19 | End: 2021-08-23 | Stop reason: HOSPADM

## 2021-08-19 RX ORDER — ACETAMINOPHEN 325 MG/1
650 TABLET ORAL EVERY 6 HOURS PRN
Status: DISCONTINUED | OUTPATIENT
Start: 2021-08-19 | End: 2021-08-23 | Stop reason: HOSPADM

## 2021-08-19 RX ORDER — TRAMADOL HYDROCHLORIDE 50 MG/1
50 TABLET ORAL EVERY 6 HOURS PRN
Status: DISCONTINUED | OUTPATIENT
Start: 2021-08-19 | End: 2021-08-23 | Stop reason: HOSPADM

## 2021-08-19 RX ORDER — SODIUM CHLORIDE 9 MG/ML
INJECTION, SOLUTION INTRAVENOUS CONTINUOUS
Status: DISCONTINUED | OUTPATIENT
Start: 2021-08-19 | End: 2021-08-21

## 2021-08-19 RX ORDER — METHOCARBAMOL 750 MG/1
750 TABLET, FILM COATED ORAL 2 TIMES DAILY
Status: COMPLETED | OUTPATIENT
Start: 2021-08-19 | End: 2021-08-20

## 2021-08-19 RX ORDER — ASPIRIN 81 MG/1
81 TABLET ORAL DAILY
COMMUNITY

## 2021-08-19 RX ORDER — TRAMADOL HYDROCHLORIDE 50 MG/1
100 TABLET ORAL EVERY 6 HOURS PRN
Status: DISCONTINUED | OUTPATIENT
Start: 2021-08-19 | End: 2021-08-23 | Stop reason: HOSPADM

## 2021-08-19 RX ORDER — ACETAMINOPHEN 500 MG
1000 TABLET ORAL ONCE
Status: COMPLETED | OUTPATIENT
Start: 2021-08-19 | End: 2021-08-19

## 2021-08-19 RX ORDER — SODIUM CHLORIDE 9 MG/ML
25 INJECTION, SOLUTION INTRAVENOUS PRN
Status: DISCONTINUED | OUTPATIENT
Start: 2021-08-19 | End: 2021-08-23 | Stop reason: HOSPADM

## 2021-08-19 RX ORDER — METHOCARBAMOL 500 MG/1
500 TABLET, FILM COATED ORAL 4 TIMES DAILY PRN
COMMUNITY

## 2021-08-19 RX ORDER — SODIUM CHLORIDE 0.9 % (FLUSH) 0.9 %
5-40 SYRINGE (ML) INJECTION EVERY 12 HOURS SCHEDULED
Status: DISCONTINUED | OUTPATIENT
Start: 2021-08-19 | End: 2021-08-23 | Stop reason: HOSPADM

## 2021-08-19 RX ORDER — SODIUM CHLORIDE, SODIUM LACTATE, POTASSIUM CHLORIDE, CALCIUM CHLORIDE 600; 310; 30; 20 MG/100ML; MG/100ML; MG/100ML; MG/100ML
1000 INJECTION, SOLUTION INTRAVENOUS ONCE
Status: COMPLETED | OUTPATIENT
Start: 2021-08-19 | End: 2021-08-19

## 2021-08-19 RX ORDER — ONDANSETRON 2 MG/ML
4 INJECTION INTRAMUSCULAR; INTRAVENOUS ONCE
Status: COMPLETED | OUTPATIENT
Start: 2021-08-19 | End: 2021-08-19

## 2021-08-19 RX ORDER — TRAMADOL HYDROCHLORIDE 50 MG/1
50 TABLET ORAL EVERY 6 HOURS PRN
COMMUNITY

## 2021-08-19 RX ORDER — FLUCONAZOLE 150 MG/1
150 TABLET ORAL DAILY
Status: COMPLETED | OUTPATIENT
Start: 2021-08-20 | End: 2021-08-20

## 2021-08-19 RX ORDER — LISINOPRIL 20 MG/1
20 TABLET ORAL DAILY
COMMUNITY

## 2021-08-19 RX ADMIN — SODIUM CHLORIDE, POTASSIUM CHLORIDE, SODIUM LACTATE AND CALCIUM CHLORIDE 1000 ML: 600; 310; 30; 20 INJECTION, SOLUTION INTRAVENOUS at 12:51

## 2021-08-19 RX ADMIN — METHOCARBAMOL 750 MG: 750 TABLET ORAL at 21:47

## 2021-08-19 RX ADMIN — SODIUM CHLORIDE: 9 INJECTION, SOLUTION INTRAVENOUS at 23:38

## 2021-08-19 RX ADMIN — CEFEPIME HYDROCHLORIDE 2000 MG: 2 INJECTION, POWDER, FOR SOLUTION INTRAVENOUS at 21:34

## 2021-08-19 RX ADMIN — VANCOMYCIN HYDROCHLORIDE 2000 MG: 10 INJECTION, POWDER, LYOPHILIZED, FOR SOLUTION INTRAVENOUS at 23:41

## 2021-08-19 RX ADMIN — ONDANSETRON 4 MG: 2 INJECTION INTRAMUSCULAR; INTRAVENOUS at 21:47

## 2021-08-19 RX ADMIN — IOPAMIDOL 80 ML: 755 INJECTION, SOLUTION INTRAVENOUS at 13:32

## 2021-08-19 RX ADMIN — ACETAMINOPHEN 650 MG: 650 SUPPOSITORY RECTAL at 21:25

## 2021-08-19 RX ADMIN — ACETAMINOPHEN 1000 MG: 500 TABLET ORAL at 12:53

## 2021-08-19 RX ADMIN — ONDANSETRON 4 MG: 2 INJECTION INTRAMUSCULAR; INTRAVENOUS at 12:52

## 2021-08-19 RX ADMIN — Medication 10 ML: at 21:47

## 2021-08-19 RX ADMIN — ENOXAPARIN SODIUM 40 MG: 40 INJECTION SUBCUTANEOUS at 21:47

## 2021-08-19 ASSESSMENT — PAIN DESCRIPTION - PAIN TYPE: TYPE: ACUTE PAIN

## 2021-08-19 ASSESSMENT — ENCOUNTER SYMPTOMS
WHEEZING: 0
VOMITING: 0
ABDOMINAL PAIN: 0
NAUSEA: 1
CHEST TIGHTNESS: 0
SHORTNESS OF BREATH: 1
CONSTIPATION: 0
SINUS PRESSURE: 1
EYES NEGATIVE: 1

## 2021-08-19 ASSESSMENT — PAIN SCALES - GENERAL
PAINLEVEL_OUTOF10: 0
PAINLEVEL_OUTOF10: 6
PAINLEVEL_OUTOF10: 5
PAINLEVEL_OUTOF10: 0
PAINLEVEL_OUTOF10: 0
PAINLEVEL_OUTOF10: 6

## 2021-08-19 ASSESSMENT — PAIN DESCRIPTION - LOCATION
LOCATION: BACK
LOCATION: BACK

## 2021-08-19 ASSESSMENT — PAIN DESCRIPTION - ORIENTATION
ORIENTATION: RIGHT
ORIENTATION: LOWER;UPPER

## 2021-08-19 NOTE — ED TRIAGE NOTES
Pt states she was seen and treated in the hospital last week due to falling after her knee gave out, last night she started with chills, fever and sob. Pt is vaccinated.

## 2021-08-19 NOTE — ED PROVIDER NOTES
[facility] RESIDENT NOTE       Date of evaluation: 8/19/2021    Chief Complaint     Fever, Shortness of Breath, and Chills      History of Present Illness     Rachael Camargo is a 76 y.o. female who presents 2 days of feeling ill. The patient states that she has had chills, headache, sinus congestion, and a dry nonproductive cough. Notably the patient states that her granddaughter and daughter who live with her recently had cold-like symptoms. The patient states she is vaccinated against Covid. She endorses some shortness of breath, no chest pain, no abdominal pain. Unsure if she has had fever, but she does endorse having some chills. She also states that she has had intermittent nausea, and decreased p.o. intake. Review of Systems     Review of Systems   Constitutional: Positive for chills, fatigue and fever. Negative for activity change. HENT: Positive for congestion and sinus pressure. Eyes: Negative. Respiratory: Positive for shortness of breath. Negative for chest tightness and wheezing. Cardiovascular: Positive for palpitations and leg swelling. Negative for chest pain. Gastrointestinal: Positive for nausea. Negative for abdominal pain, constipation and vomiting. Genitourinary: Negative for decreased urine volume and dysuria. Musculoskeletal: Positive for myalgias. Neurological: Positive for weakness. Negative for dizziness, syncope and light-headedness. Psychiatric/Behavioral: Negative. Past Medical, Surgical, Family, and Social History     She has a past medical history of HTN (hypertension), Hyperlipidemia, Osteoarthritis, and Type II or unspecified type diabetes mellitus without mention of complication, not stated as uncontrolled. She has a past surgical history that includes joint replacement (4/03,05,07); Lumbar disc surgery; lumbar laminectomy; and Cholecystectomy.   Her family history includes Heart Disease in her mother; High Blood Pressure in her mother; Other in her father. She reports that she has quit smoking. She quit after 25.00 years of use. She has never used smokeless tobacco. She reports previous alcohol use of about 1.0 standard drinks of alcohol per week. She reports that she does not use drugs. Medications     Previous Medications    ACETAMINOPHEN (TYLENOL) 325 MG TABLET    Take 2 tablets by mouth every 6 hours as needed for Pain    ASPIRIN 81 MG EC TABLET    Take 81 mg by mouth daily    DIPHENHYDRAMINE-APAP, SLEEP, (TYLENOL PM EXTRA STRENGTH)  MG TABS    Take 1 tablet by mouth nightly as needed     FUROSEMIDE (LASIX) 40 MG TABLET    Take 20 mg by mouth daily     INSULIN GLARGINE (LANTUS) 100 UNIT/ML INJECTION VIAL    Inject 40 Units into the skin nightly     LISINOPRIL (PRINIVIL;ZESTRIL) 20 MG TABLET    Take 20 mg by mouth daily    METHOCARBAMOL (ROBAXIN) 500 MG TABLET    Take 500 mg by mouth 4 times daily as needed    ONE TOUCH CLUB LANCETS MISC    One Touch Ultra 2  Test 2-3 times daily    ONE TOUCH ULTRA TEST STRIP    USE AS DIRECTED TO TEST 2-3 TIMES PER DAY    TRAMADOL (ULTRAM) 50 MG TABLET    Take 50 mg by mouth every 6 hours as needed for Pain. Allergies     She is allergic to pcn [penicillins]. Physical Exam     INITIAL VITALS: BP: (!) 127/56, Temp: 102.9 °F (39.4 °C), Pulse: 106, Resp: 14, SpO2: 96 %   Physical Exam  Constitutional:       General: She is not in acute distress. Appearance: She is well-developed. She is obese. She is ill-appearing. She is not toxic-appearing. HENT:      Head: Normocephalic and atraumatic. Eyes:      Extraocular Movements: Extraocular movements intact. Pupils: Pupils are equal, round, and reactive to light. Cardiovascular:      Rate and Rhythm: Normal rate and regular rhythm. Pulmonary:      Effort: Pulmonary effort is normal.      Breath sounds: Normal breath sounds. Abdominal:      Palpations: Abdomen is soft. Monocytes % 5.1 %    Eosinophils % 0.4 %    Basophils % 0.3 %    Neutrophils Absolute 18.0 (H) 1.7 - 7.7 K/uL    Lymphocytes Absolute 0.4 (L) 1.0 - 5.1 K/uL    Monocytes Absolute 1.0 0.0 - 1.3 K/uL    Eosinophils Absolute 0.1 0.0 - 0.6 K/uL    Basophils Absolute 0.1 0.0 - 0.2 K/uL   Basic Metabolic Panel   Result Value Ref Range    Sodium 131 (L) 136 - 145 mmol/L    Potassium 4.7 3.5 - 5.1 mmol/L    Chloride 98 (L) 99 - 110 mmol/L    CO2 17 (L) 21 - 32 mmol/L    Anion Gap 16 3 - 16    Glucose 147 (H) 70 - 99 mg/dL    BUN 30 (H) 7 - 20 mg/dL    CREATININE 1.4 (H) 0.6 - 1.2 mg/dL    GFR Non- 38 (A) >60    GFR  45 (A) >60    Calcium 9.0 8.3 - 10.6 mg/dL   Lactate, Sepsis   Result Value Ref Range    Lactic Acid, Sepsis 2.4 (H) 0.4 - 1.9 mmol/L   Blood gas, venous (Lab)   Result Value Ref Range    pH, Aguilar 7.420 7.350 - 7.450    pCO2, Aguilar 33.5 (L) 41.0 - 51.0 mmHg    pO2, Aguilar 44.5 (H) 25 - 40 mmHg    HCO3, Venous 21.7 (L) 24.0 - 28.0 mmol/L    Base Excess, Aguilar -2.1 (L) -2.0 - 3.0 mmol/L    O2 Sat, Aguilar 81 Not established %    Carboxyhemoglobin 1.2 0.0 - 1.5 %    MetHgb, Aguilar 0.1 0.0 - 1.5 %    TC02 (Calc), Aguilar 23 mmol/L    Hemoglobin, Aguilar, Reduced 18.70 %   Brain Natriuretic Peptide   Result Value Ref Range    Pro-BNP 1,058 (H) 0 - 124 pg/mL   Troponin   Result Value Ref Range    Troponin <0.01 <0.01 ng/mL   Hepatic Function Panel   Result Value Ref Range    Total Protein 7.6 6.4 - 8.2 g/dL    Albumin 3.6 3.4 - 5.0 g/dL    Alkaline Phosphatase 109 40 - 129 U/L    ALT 13 10 - 40 U/L    AST 26 15 - 37 U/L    Total Bilirubin 0.7 0.0 - 1.0 mg/dL    Bilirubin, Direct <0.2 0.0 - 0.3 mg/dL    Bilirubin, Indirect see below 0.0 - 1.0 mg/dL   Lactate, plasma   Result Value Ref Range    Lactic Acid 1.7 0.4 - 2.0 mmol/L       ED BEDSIDE ULTRASOUND:  None    RECENT VITALS:  BP: (!) 124/50, Temp: 101.5 °F (38.6 °C), Pulse: 71,Resp: 15, SpO2: 95 %     Procedures     None    ED Course     Nursing Notes, Past Prescriptions    No medications on file       DISPOSITION Admitted 08/19/2021 02:37:56 PM     Arianna Lockett MD  Resident  08/19/21 [id number]

## 2021-08-19 NOTE — H&P
Hospital Medicine History & Physical      PCP: Misael Bautista    Date of Admission: 8/19/2021    Date of Service: Pt seen/examined on 8/19/2021 and Admitted to Inpatient with expected LOS greater than two midnights due to medical therapy. Chief Complaint:  Chills, fever       History Of Present Illness: The patient is a 76 y.o. female with medical history of HTN, DM type 2, OA, who presents to [facility] with fevers and chills that started this morning. Patient reports mild runny nose and dry cough that started yesterday. Some of her family members just got over URI type illness. This morning she developed chills and rigors to the point she was unable to use her phone. Also had dry heaving. Temp at home was 103 and she came to ER. Denies any emesis, diarrhea, abdominal pain, chest pain. Has acute on chronic back pain that has been present for few days since a fall. She was in ER on 8/13 and underwent evaluation with CT and MRI of thoracic and lumbar spine, which were negative for fracture, showed post operative seroma without evidence of infection. Imaging was done without contrast.     Past Medical History:        Diagnosis Date    HTN (hypertension) 3/15/2013    Hyperlipidemia 12/23/2011    Osteoarthritis     Type II or unspecified type diabetes mellitus without mention of complication, not stated as uncontrolled        Past Surgical History:        Procedure Laterality Date    CHOLECYSTECTOMY      JOINT REPLACEMENT  4/03,05,07    hips,knee    LUMBAR DISC SURGERY      LUMBAR LAMINECTOMY         Medications Prior to Admission:    Prior to Admission medications    Medication Sig Start Date End Date Taking?  Authorizing Provider   acetaminophen (TYLENOL) 325 MG tablet Take 2 tablets by mouth every 6 hours as needed for Pain 8/13/21   Tricia Aschoff, PA   lisinopril-hydrochlorothiazide (PRINZIDE;ZESTORETIC) 10-12.5 MG per tablet TAKE ONE TABLET BY MOUTH EVERY DAY 1/30/14   Robert Wayne Lawrence Lanes, MD   lisinopril-hydrochlorothiazide (PRINZIDE;ZESTORETIC) 10-12.5 MG per tablet TAKE ONE TABLET BY MOUTH EVERY DAY 12/26/13   Herbie Cooper MD   azithromycin (ZITHROMAX) 250 MG tablet Take 2 tabs (500 mg) on Day 1, and take 1 tab (250 mg) on days 2 through 5. 11/25/13   Herbie Cooper MD   ONE TOUCH ULTRA TEST strip USE AS DIRECTED TO TEST 2-3 TIMES PER DAY 7/29/13   Herbie Cooper MD   metformin (GLUCOPHAGE) 500 MG tablet TAKE TWO TABLETS BY MOUTH TWICE DAILY WITH MEALS. 4/12/13   Herbie Cooper MD   Multiple Vitamin (MULTI-VITAMIN) TABS Take  by mouth. Historical Provider, MD   Diphenhydramine-APAP, sleep, (TYLENOL PM EXTRA STRENGTH)  MG TABS Take  by mouth. Historical Provider, MD   naproxen sodium (ALEVE) 220 MG tablet Take 220 mg by mouth 2 times daily (with meals). Historical Provider, MD   ONE TOUCH CLUB LANCETS MISC One Touch Ultra 2  Test 2-3 times daily 6/13/11   Herbie Cooper MD       Allergies:  Pcn [penicillins]    Social History:  The patient currently lives at home    TOBACCO:   reports that she has quit smoking. She quit after 25.00 years of use. She has never used smokeless tobacco.  ETOH:   reports previous alcohol use of about 1.0 standard drinks of alcohol per week. Family History:  Reviewed in detail and Positive as follows:        Problem Relation Age of Onset    High Blood Pressure Mother     Heart Disease Mother         a fib   [place] Other Father         seizures       REVIEW OF SYSTEMS:   Positive and negative as noted in the HPI. All other systems reviewed and negative. PHYSICAL EXAM:    BP (!) 123/44   Pulse 76   Temp 101.5 °F (38.6 °C) (Oral)   Resp 13   Ht 6' (1.829 m)   Wt (!) 310 lb (140.6 kg)   SpO2 96%   BMI 42.04 kg/m²     General appearance: mild distress appears stated age and cooperative. HEENT Normal cephalic, atraumatic without obvious deformity. Conjunctivae/corneas clear.   Neck: Supple, No jugular venous distention/bruits. Trachea midline without thyromegaly or adenopathy with full range of motion. Lungs: Clear to auscultation, bilaterally without Rales/Wheezes/Rhonchi with good respiratory effort. Heart: Regular rate and rhythm with Normal S1/S2 without murmurs, rubs or gallops, point of maximum impulse non-displaced  Abdomen: Soft, non-tender or non-distended without rigidity or guarding and positive bowel sounds all four quadrants. Extremities: No clubbing, cyanosis, or edema bilaterally. Skin: Skin color, texture, turgor normal.  Scattered bruises on LE  Neurologic: Alert and oriented X 3, grossly non-focal.  Mental status: Alert, oriented, thought content appropriate. Capillary refill is brisk  Peripheral pulses 2+    CXR:  I have reviewed the CXR with the following interpretation: no infiltrate  EKG:  I have reviewed the EKG with the following interpretation: sinus with RBBB and left axis    CBC   Recent Labs     08/19/21  1223   WBC 19.5*   HGB 11.5*   HCT 34.4*         RENAL  Recent Labs     08/19/21  1223   *   K 4.7   CL 98*   CO2 17*   BUN 30*   CREATININE 1.4*     LFT'S  Recent Labs     08/19/21  1223   AST 26   ALT 13   BILIDIR <0.2   BILITOT 0.7   ALKPHOS 109     COAG  No results for input(s): INR in the last 72 hours. CARDIAC ENZYMES  Recent Labs     08/19/21  1223   TROPONINI <0.01       U/A:    Lab Results   Component Value Date    NITRITE neg 07/30/2011    COLORU Yellow 08/13/2021    CLARITYU Clear 08/13/2021    SPECGRAV 1.010 08/13/2021    LEUKOCYTESUR Negative 08/13/2021    BLOODU Negative 08/13/2021    GLUCOSEU Negative 08/13/2021       ABG  No results found for: GNL0LNK, BEART, T4QONYLM, PHART, THGBART, INM7AEY, PO2ART, SJL7ZHX        Active Hospital Problems    Diagnosis Date Noted    Sepsis ([facility] [address] [A41.9] 08/19/2021         ASSESSMENT/PLAN:    Sepsis:  Collect blood and urine cultures  CXR, CT A/P- non acute.  Rapid flu negative  Monitor back symptoms  Consult infectious disease  Start empiric antibiotics after cultures collected    SACHIN:  Likely prerenal losses due to fever. Hydrate with IVF, hold home lisinopril and diuretics    Vaginal candidiasis:  Cont with home diflucan. CT A/P non acute and symptoms are improving. HTN:  Hold ACEI until SACHIN resolves    Acute on chronic back pain:  Recent imaging from 8/13 was negative for infection. May need repeat imaging if continues to be febrile without source. DVT Prophylaxis: lovenox  Diet: No diet orders on file  Code Status: No Order  PT/OT Eval Status: ordered    Leonela Estrada MD    Thank you Watson Orozco for the opportunity to be involved in this patient's care. If you have any questions or concerns please feel free to contact me at [phone].

## 2021-08-19 NOTE — PROGRESS NOTES
List of Home Medications the patient is currently taking is complete. Home Medication list in EPIC updated to reflect changes noted below. Source of medications in list is patient interview, and [employer] prescription fill history. Medications added:   Lantus   Aspirin   Tramadol   Lisinopril   Furosemide   Methocarbamol    Medications removed:   Lisinopril-HCTZ   Metformin   Azithromycin   Naproxen   Multivitamin      No current facility-administered medications on file prior to encounter. Medication Sig    lisinopril (PRINIVIL;ZESTRIL) 20 MG tablet Take 20 mg by mouth daily    insulin glargine (LANTUS) 100 UNIT/ML injection vial Inject 40 Units into the skin nightly     furosemide (LASIX) 40 MG tablet Take 20 mg by mouth daily     traMADol (ULTRAM) 50 MG tablet Take 50 mg by mouth every 6 hours as needed for Pain.  aspirin 81 MG EC tablet Take 81 mg by mouth daily    acetaminophen (TYLENOL) 325 MG tablet Take 2 tablets by mouth every 6 hours as needed for Pain    Diphenhydramine-APAP, sleep, (TYLENOL PM EXTRA STRENGTH)  MG TABS Take  by mouth. Please call with questions.   Nancy Ag, PharmD, BCPS  Main pharmacy: [id number]  8/19/2021 6:54 PM

## 2021-08-20 LAB
ALBUMIN SERPL-MCNC: 2.9 G/DL (ref 3.4–5)
ANION GAP SERPL CALCULATED.3IONS-SCNC: 13 MMOL/L (ref 3–16)
BASOPHILS ABSOLUTE: 0.1 K/UL (ref 0–0.2)
BASOPHILS RELATIVE PERCENT: 0.3 %
BUN BLDV-MCNC: 35 MG/DL (ref 7–20)
CALCIUM SERPL-MCNC: 8.3 MG/DL (ref 8.3–10.6)
CHLORIDE BLD-SCNC: 99 MMOL/L (ref 99–110)
CO2: 21 MMOL/L (ref 21–32)
CREAT SERPL-MCNC: 1.9 MG/DL (ref 0.6–1.2)
EOSINOPHILS ABSOLUTE: 0 K/UL (ref 0–0.6)
EOSINOPHILS RELATIVE PERCENT: 0.1 %
GFR AFRICAN AMERICAN: 32
GFR NON-AFRICAN AMERICAN: 26
GLUCOSE BLD-MCNC: 138 MG/DL (ref 70–99)
GLUCOSE BLD-MCNC: 141 MG/DL (ref 70–99)
GLUCOSE BLD-MCNC: 154 MG/DL (ref 70–99)
GLUCOSE BLD-MCNC: 162 MG/DL (ref 70–99)
GLUCOSE BLD-MCNC: 163 MG/DL (ref 70–99)
HCT VFR BLD CALC: 29.3 % (ref 36–48)
HEMOGLOBIN: 9.7 G/DL (ref 12–16)
LYMPHOCYTES ABSOLUTE: 0.7 K/UL (ref 1–5.1)
LYMPHOCYTES RELATIVE PERCENT: 2.7 %
MCH RBC QN AUTO: 28.9 PG (ref 26–34)
MCHC RBC AUTO-ENTMCNC: 33.2 G/DL (ref 31–36)
MCV RBC AUTO: 86.9 FL (ref 80–100)
MONOCYTES ABSOLUTE: 1.6 K/UL (ref 0–1.3)
MONOCYTES RELATIVE PERCENT: 6.3 %
NEUTROPHILS ABSOLUTE: 23.1 K/UL (ref 1.7–7.7)
NEUTROPHILS RELATIVE PERCENT: 90.6 %
PDW BLD-RTO: 14.5 % (ref 12.4–15.4)
PERFORMED ON: ABNORMAL
PHOSPHORUS: 4.1 MG/DL (ref 2.5–4.9)
PLATELET # BLD: 219 K/UL (ref 135–450)
PMV BLD AUTO: 8.1 FL (ref 5–10.5)
POTASSIUM SERPL-SCNC: 4.6 MMOL/L (ref 3.5–5.1)
RBC # BLD: 3.37 M/UL (ref 4–5.2)
REPORT: NORMAL
SARS-COV-2: NOT DETECTED
SODIUM BLD-SCNC: 133 MMOL/L (ref 136–145)
VANCOMYCIN RANDOM: 24.8 UG/ML
WBC # BLD: 25.4 K/UL (ref 4–11)

## 2021-08-20 PROCEDURE — 6360000002 HC RX W HCPCS: Performed by: INTERNAL MEDICINE

## 2021-08-20 PROCEDURE — 80202 ASSAY OF VANCOMYCIN: CPT

## 2021-08-20 PROCEDURE — 2580000003 HC RX 258: Performed by: INTERNAL MEDICINE

## 2021-08-20 PROCEDURE — 80069 RENAL FUNCTION PANEL: CPT

## 2021-08-20 PROCEDURE — 85025 COMPLETE CBC W/AUTO DIFF WBC: CPT

## 2021-08-20 PROCEDURE — 99222 1ST HOSP IP/OBS MODERATE 55: CPT | Performed by: INTERNAL MEDICINE

## 2021-08-20 PROCEDURE — 2060000000 HC ICU INTERMEDIATE R&B

## 2021-08-20 PROCEDURE — 6370000000 HC RX 637 (ALT 250 FOR IP): Performed by: INTERNAL MEDICINE

## 2021-08-20 PROCEDURE — 36415 COLL VENOUS BLD VENIPUNCTURE: CPT

## 2021-08-20 RX ADMIN — ONDANSETRON 4 MG: 2 INJECTION INTRAMUSCULAR; INTRAVENOUS at 13:11

## 2021-08-20 RX ADMIN — FLUCONAZOLE 150 MG: 150 TABLET ORAL at 13:11

## 2021-08-20 RX ADMIN — INSULIN LISPRO 1 UNITS: 100 INJECTION, SOLUTION INTRAVENOUS; SUBCUTANEOUS at 17:40

## 2021-08-20 RX ADMIN — METHOCARBAMOL 750 MG: 750 TABLET ORAL at 19:32

## 2021-08-20 RX ADMIN — ACETAMINOPHEN 650 MG: 325 TABLET ORAL at 19:38

## 2021-08-20 RX ADMIN — ACETAMINOPHEN 650 MG: 325 TABLET ORAL at 11:23

## 2021-08-20 RX ADMIN — METHOCARBAMOL 750 MG: 750 TABLET ORAL at 09:08

## 2021-08-20 RX ADMIN — ENOXAPARIN SODIUM 40 MG: 40 INJECTION SUBCUTANEOUS at 17:40

## 2021-08-20 RX ADMIN — ONDANSETRON 4 MG: 2 INJECTION INTRAMUSCULAR; INTRAVENOUS at 19:29

## 2021-08-20 RX ADMIN — SODIUM CHLORIDE: 9 INJECTION, SOLUTION INTRAVENOUS at 22:12

## 2021-08-20 RX ADMIN — Medication 10 ML: at 19:32

## 2021-08-20 RX ADMIN — SODIUM CHLORIDE: 9 INJECTION, SOLUTION INTRAVENOUS at 13:11

## 2021-08-20 RX ADMIN — CEFEPIME HYDROCHLORIDE 2000 MG: 2 INJECTION, POWDER, FOR SOLUTION INTRAVENOUS at 09:08

## 2021-08-20 RX ADMIN — CEFTRIAXONE SODIUM 2000 MG: 2 INJECTION, POWDER, FOR SOLUTION INTRAMUSCULAR; INTRAVENOUS at 17:41

## 2021-08-20 ASSESSMENT — PAIN SCALES - GENERAL
PAINLEVEL_OUTOF10: 0
PAINLEVEL_OUTOF10: 4
PAINLEVEL_OUTOF10: 0

## 2021-08-20 ASSESSMENT — PAIN DESCRIPTION - PAIN TYPE
TYPE: ACUTE PAIN
TYPE: ACUTE PAIN

## 2021-08-20 ASSESSMENT — PAIN DESCRIPTION - LOCATION: LOCATION: HEAD

## 2021-08-20 NOTE — CONSULTS
Infectious Diseases Inpatient Consult Note    Reason for Consult:   E coli bacteremia  Requesting Physician:   Dr Ke Rojo  Primary Care Physician:  Cherie To  History Obtained From:   Pt, EPIC    Admit Date: 8/19/2021  Hospital Day: 2    CHIEF COMPLAINT:       Chief Complaint   Patient presents with    Fever    Shortness of Breath    Chills       HISTORY OF PRESENT ILLNESS:      77 yo woman with hx DM, HTN, OA    Presents with fever / chills, 'shakes'. Onset on morning that she came to hospital  Temp 103 at home  + sick contact, relatives with mild resp sx. In ED 8/19, T 102.9, WBC 19.5, UA neg, CXR neg  Admit, started on vancomycin (1 dose), cefepime. Today 8/20, afeb, WBC 25  Feels better - denies any resp / GI /  sx    SARS CoV-2 PCR neg  BC x 1 GNR, E coli by PCR    Past Medical History:    Past Medical History:   Diagnosis Date    HTN (hypertension) 3/15/2013    Hyperlipidemia 12/23/2011    Osteoarthritis     Type II or unspecified type diabetes mellitus without mention of complication, not stated as uncontrolled        Past Surgical History:    Past Surgical History:   Procedure Laterality Date    CHOLECYSTECTOMY      JOINT REPLACEMENT  4/03,05,07    hips,knee    LUMBAR DISC SURGERY      LUMBAR LAMINECTOMY         Current Medications:     cefepime  2,000 mg Intravenous Q12H    insulin lispro  0-6 Units Subcutaneous TID WC    insulin lispro  0-3 Units Subcutaneous Nightly    sodium chloride flush  5-40 mL Intravenous 2 times per day    enoxaparin  40 mg Subcutaneous Daily    methocarbamol  750 mg Oral BID       Allergies:  Pcn [penicillins]    Social History:    TOBACCO:    None - past cig use  ETOH:    None   DRUGS:   None   MARITAL STATUS:      OCCUPATION:   None     Family History:   No immunodeficiency    REVIEW OF SYSTEMS:    No fever / chills / sweats. No weight loss. No visual change, eye pain, eye discharge.     No oral lesion, sore throat, dysphagia. Denies cough / sputum. Denies chest pain, palpitations. Denies n / v / abd pain. No diarrhea. Denies dysuria or change in urinary function. Denies joint swelling or pain. No myalgia, arthralgia. Denies skin changes, itching  Denies focal weakness, sensory change or other neurologic symptom    Denies new / worse depression, psychiatric symptoms    PHYSICAL EXAM:      Vitals:    BP (!) 118/50   Pulse 69   Temp 98.6 °F (37 °C) (Oral)   Resp 18   Ht 6' (1.829 m)   Wt (!) 322 lb 12.1 oz (146.4 kg)   SpO2 96%   BMI 43.77 kg/m²     GENERAL: No apparent distress. HEENT: Membranes moist, no oral lesion, PERRL  NECK:  Supple, no lymphadenopathy  LUNGS: Clear b/l, no rales, no dullness  CARDIAC: RRR, no murmur appreciated  ABD:  + BS, soft / NT; no SP tenderness, CVAT  EXT:  No rash, no edema, no lesions  NEURO: No focal neurologic findings  PSYCH: Orientation, sensorium, mood normal  LINES:  Peripheral iv    DATA:    Lab Results   Component Value Date    WBC 25.4 (H) 2021    HGB 9.7 (L) 2021    HCT 29.3 (L) 2021    MCV 86.9 2021     2021     Lab Results   Component Value Date    CREATININE 1.9 (H) 2021    BUN 35 (H) 2021     (L) 2021    K 4.6 2021    CL 99 2021    CO2 21 2021       Hepatic Function Panel:   Lab Results   Component Value Date    ALKPHOS 109 2021    ALT 13 2021    AST 26 2021    PROT 7.6 2021    PROT 7.7 2012    BILITOT 0.7 2021    BILIDIR <0.2 2021    IBILI see below 2021    LABALBU 2.9 2021       Micro:   SARS CoV-2 PCR neg   BC x 1 GNR, E coli by PCR    Imagin/19 Abd / Pelvic CT  1. No acute abdominal or pelvic and normality identified. 2. Diverticulosis without diverticulitis. 3. Coronary artery calcification.       CXR  'no acute disease'      IMPRESSION:      Patient Active Problem List   Diagnosis    Venous insufficiency  Type II or unspecified type diabetes mellitus without mention of complication, not stated as uncontrolled    Hyperlipidemia    HTN (hypertension)    Hip dislocation, left (HCC)    Sepsis ([facility] [address]       Hx DM, HTN, OA    Fever  Leukocytosis  E coli bacteremia   - unclear source, UA neg, abd/ pelvic CT neg   - may be occult GI source, does have diverticulosis    RECOMMENDATIONS:    Change to ceftriaxone   Await E coli ID / sensitivities    If improved, WBC down, isolate sensitive to quinolones, can change to oral quinolone and treat for additional 10 days    Medical Decision Making:   The following items were considered in medical decision making:  Discussion of patient care with other providers  Reviewed clinical lab tests  Reviewed radiology tests  Reviewed other diagnostic tests/interventions  Independent review of radiologic images  Microbiology cultures and other micro tests reviewed      Discussed with pt  Jessica Gudino MD

## 2021-08-20 NOTE — PROGRESS NOTES
mildly toxic distress, appears stated age and cooperative. Lungs: Clear to ascultation, bilaterally without Rales/Wheezes/Rhonchi with good respiratory effort. Heart: Regular rate and rhythm with Normal S1/S2 without  murmurs, rubs or gallops, point of maximum impulse non-displaced  Abdomen: Soft, non-tender or non-distended without rigidity or guarding and positive bowel sounds all four quadrants. Extremities: No clubbing, cyanosis, or edema bilaterally. Skin: Skin color, texture, turgor normal.    Neurologic: Alert and oriented X 3,  grossly non-focal.  Mental status: Alert, oriented, thought content appropriate. Data    Recent Labs     08/19/21  1223 08/20/21  0425   WBC 19.5* 25.4*   HGB 11.5* 9.7*   HCT 34.4* 29.3*    219      Recent Labs     08/19/21  1223 08/20/21  0425   * 133*   K 4.7 4.6   CL 98* 99   CO2 17* 21   PHOS  --  4.1   BUN 30* 35*   CREATININE 1.4* 1.9*     Recent Labs     08/19/21  1223   AST 26   ALT 13   BILIDIR <0.2   BILITOT 0.7   ALKPHOS 109     No results for input(s): INR in the last 72 hours. Recent Labs     08/19/21  1223   TROPONINI <0.01       Consults:     IP CONSULT TO HOSPITALIST  IP CONSULT TO PHARMACY  IP CONSULT TO INFECTIOUS DISEASES    Active Hospital Problems    Diagnosis Date Noted    Sepsis ([facility] [address] [A41.9] 08/19/2021         ASSESSMENT AND PLAN      Sepsis:  Follow up on blood and urine cultures  CXR, CT A/P- non acute. Rapid flu negative. COVID 19 negative. Could be related to UTI- called lab and added culture  Cont with vanco and cefepime  ID consulted     SACHIN:  This is worsened. Likely prerenal losses due to fever/sepsis. Continue to [facility] with IVF, hold home lisinopril and diuretics  Consult nephrology     Vaginal candidiasis:  Cont with home diflucan. CT A/P non acute.     HTN:  Hold ACEI until SACHIN resolves     Acute on chronic back pain:  Recent imaging from 8/13 was negative for infection.  May need repeat imaging if continues to be febrile without source. DVT Prophylaxis: lovenox  Diet: ADULT DIET; Regular; 4 carb choices (60 gm/meal);  Low Fat/Low Chol/High Fiber/JAISON  Code Status: Full Code    PT/OT Mary Tran MD

## 2021-08-20 NOTE — PROGRESS NOTES
Clinical Pharmacy Progress Note    Vancomycin has been discontinued. Pharmacy will sign off. Please re-consult pharmacy if vancomycin dosing is wanted in the future. Please call with questions.   Shea Caballero PharmD, BCPS  Wireless: [phone]   8/20/2021 1:08 PM

## 2021-08-20 NOTE — PROGRESS NOTES
4 Eyes Admission Assessment     I agree as the admission nurse that 2 RN's have performed a thorough Head to Toe Skin Assessment on the patient. ALL assessment sites listed below have been assessed on admission. Areas assessed by both nurses:   [x]   Head, Face, and Ears   [x]   Shoulders, Back, and Chest  [x]   Arms, Elbows, and Hands   [x]   Coccyx, Sacrum, and Ischium  [x]   Legs, Feet, and Heels        Does the Patient have Skin Breakdown?   No         Mark Prevention initiated:  NA   Wound Care Orders initiated:  NA      WOC nurse consulted for Pressure Injury (Stage 3,4, Unstageable, DTI, NWPT, and Complex wounds) or Mark score 18 or lower:  NA      Nurse 1 eSignature: Electronically signed by Miri Lay RN on 8/20/21 at 4:32 AM EDT    **SHARE this note so that the co-signing nurse is able to place an eSignature**    Nurse 2 eSignature: Electronically signed by Mirian Ochoa RN on 8/20/21 at 4:21 AM EDT

## 2021-08-20 NOTE — CONSULTS
Clinical Pharmacy Consult Note    Admit date: 8/19/2021    Subjective/Objective:  76 y.o. female with medical history of HTN, DM type 2, OA, and vaginal candidiasis (on diflucan) who presented to ED with sepsis. Pharmacy is consulted to dose Vancomycin per Dr. Carol Quintana    Pertinent Medications:  Vancomycin -- pharmacy to dose -- day #1   2 g IV x1 (8/19)   1.5 g IV q24h (to start 8/20)    Recent Labs     08/19/21  1223   *   K 4.7   CL 98*   CO2 17*   BUN 30*   CREATININE 1.4*     Estimated Creatinine Clearance: 64 mL/min (A) (based on SCr of 1.4 mg/dL (H)). Recent Labs     08/19/21  1223   WBC 19.5*   HGB 11.5*   HCT 34.4*   MCV 86.0        Height:  6' (182.9 cm)  Weight: Weight: (!) 322 lb 12.1 oz (146.4 kg)    Micro:  8/19 Rapid flu = negative   8/19 Blood = sent    Assessment/Plan:  1. Sepsis: cefepime + vancomycin - day #1  Vancomycin  · Will give a 2 g IV x1 loading dose, then start 1500 mg IV q24h. Estimated AUC of 607 mg/L*hr.  · Patient is at risk for accumulation due to BMI. · Clinical pharmacist will follow-up in AM.  · Renal function will be monitored closely and dosing will be adjusted as appropriate. Please call with any questions. Thank you for consulting pharmacy!   Makenzie Lobo, PharmD  Main Pharmacy: [id number]  8/19/2021 8:26 PM

## 2021-08-20 NOTE — PROGRESS NOTES
Clinical Pharmacy Consult Note    Admit date: 8/19/2021    Subjective/Objective:  76 y.o. female with medical history of HTN, DM type 2, OA, and vaginal candidiasis (on diflucan) who presented to ED with sepsis. Pharmacy is consulted to dose Vancomycin per Dr. Erasmo Toribio    Pertinent Medications:  Vancomycin -- pharmacy to dose -- day #1   2 g IV x1 (8/19)   1.5 g IV q24h (to start 8/20)    Recent Labs     08/19/21  1223   *   K 4.7   CL 98*   CO2 17*   BUN 30*   CREATININE 1.4*     Estimated Creatinine Clearance: 64 mL/min (A) (based on SCr of 1.4 mg/dL (H)). Recent Labs     08/19/21  1223   WBC 19.5*   HGB 11.5*   HCT 34.4*   MCV 86.0        Height:  6' (182.9 cm)  Weight: Weight: (!) 322 lb 12.1 oz (146.4 kg)    Micro:  8/19 Rapid flu = negative   8/19 Blood = sent    Assessment/Plan:  1. Sepsis: cefepime + vancomycin - day #1  Vancomycin  · Will give a 2 g IV x1 loading dose, then start 1500 mg IV q24h. Estimated AUC of 607 mg/L*hr and trough of 15.8 mcg/mL. · Patient is at risk for accumulation due to BMI. · Clinical pharmacist will follow-up in AM.  · Renal function will be monitored closely and dosing will be adjusted as appropriate. Please call with any questions. Thank you for consulting pharmacy!   Francesca Land, PharmD  Main Pharmacy: [id number]  8/19/2021 8:57 PM

## 2021-08-20 NOTE — PROGRESS NOTES
Clinical Pharmacy Progress Note    ADMIT DATE: 8/19/2021     77 yo F with PMH of HTN, DM2, OA who presented 8/19 with fevers and chills. Admitted with sepsis and started on empiric antibiotics. Pharmacy has been consulted to dose vancomycin by Dr. Dolly Panda. PERTINENT MEDICATIONS:  Cefepime 2g IV q12h - day #2  Vancomycin - pharmacy to dose - day #2   8/19   Intermittent dosing (8/20-current)  Date Dose Vanc Level   8/20  Ordered               LABORATORY:  Recent Labs     08/19/21  1223 08/20/21  0425   * 133*   K 4.7 4.6   CL 98* 99   CO2 17* 21   BUN 30* 35*   CREATININE 1.4* 1.9*   GLUCOSE 147* 163*     Estimated Creatinine Clearance: 47 mL/min (A) (based on SCr of 1.9 mg/dL (H)). Recent Labs     08/19/21  1223 08/20/21  0425   WBC 19.5* 25.4*   HGB 11.5* 9.7*    219     MICROBIOLOGY:  Rapid influenza (8/19):  Negative  Blood (8/19): Sent x 2  Urine:  Sent    VITALS:  BP (!) 100/44   Pulse 68   Temp 99.1 °F (37.3 °C) (Oral)   Resp 18   Ht 6' (1.829 m)   Wt (!) 322 lb 12.1 oz (146.4 kg)   SpO2 100%   BMI 43.77 kg/m²     Intake/Output Summary (Last 24 hours) at 8/20/2021 1102  Last data filed at 8/19/2021 2346  Gross per 24 hour   Intake --   Output 800 ml   Net -800 ml     ASSESSMENT/PLAN:  1)  Sepsis: Cefepime + Vancomycin - Day #2  · Vancomycin--pharmacy to dose  · Pt admitted with SACHIN, SCr worsening today (1.4?1.9). · Will dose vancomycin based on intermittent levels for now. Have discontinued scheduled vancomycin and entered placeholder onto [facility] · Received 2g IV x1 overnight. · Random level ordered for today - will re-dose as appropriate. · Clinical status will be monitored closely / dose will be adjusted as appropriate. Please call with any questions. Tania FanD, BCPS  Wireless: [id number]   8/20/2021 11:02 AM    Addendum 8/20/21 12:45:  Vancomycin level ordered for 11:30 was actually added on to AM labs. Level = 24.8 mcg/mL, which was ~5h after previous dose. Will give 1g IV x1 this evening (timed ~24h after last dose). Random level ordered for tomorrow AM, Sat 8/21 -- will monitor renal function closely.      Corie Lemus PharmD, BCPS  Wireless: [id number]   8/20/2021 12:49 PM

## 2021-08-21 LAB
ALBUMIN SERPL-MCNC: 2.9 G/DL (ref 3.4–5)
ANION GAP SERPL CALCULATED.3IONS-SCNC: 13 MMOL/L (ref 3–16)
BASOPHILS ABSOLUTE: 0 K/UL (ref 0–0.2)
BASOPHILS RELATIVE PERCENT: 0.3 %
BUN BLDV-MCNC: 35 MG/DL (ref 7–20)
CALCIUM SERPL-MCNC: 8.2 MG/DL (ref 8.3–10.6)
CHLORIDE BLD-SCNC: 99 MMOL/L (ref 99–110)
CO2: 19 MMOL/L (ref 21–32)
CREAT SERPL-MCNC: 1.8 MG/DL (ref 0.6–1.2)
EOSINOPHILS ABSOLUTE: 0 K/UL (ref 0–0.6)
EOSINOPHILS RELATIVE PERCENT: 0.3 %
GFR AFRICAN AMERICAN: 34
GFR NON-AFRICAN AMERICAN: 28
GLUCOSE BLD-MCNC: 122 MG/DL (ref 70–99)
GLUCOSE BLD-MCNC: 128 MG/DL (ref 70–99)
GLUCOSE BLD-MCNC: 128 MG/DL (ref 70–99)
GLUCOSE BLD-MCNC: 129 MG/DL (ref 70–99)
GLUCOSE BLD-MCNC: 136 MG/DL (ref 70–99)
HCT VFR BLD CALC: 27.2 % (ref 36–48)
HEMOGLOBIN: 9.1 G/DL (ref 12–16)
LYMPHOCYTES ABSOLUTE: 0.4 K/UL (ref 1–5.1)
LYMPHOCYTES RELATIVE PERCENT: 2.6 %
MCH RBC QN AUTO: 28.6 PG (ref 26–34)
MCHC RBC AUTO-ENTMCNC: 33.4 G/DL (ref 31–36)
MCV RBC AUTO: 85.8 FL (ref 80–100)
MONOCYTES ABSOLUTE: 0.9 K/UL (ref 0–1.3)
MONOCYTES RELATIVE PERCENT: 6.1 %
NEUTROPHILS ABSOLUTE: 13.5 K/UL (ref 1.7–7.7)
NEUTROPHILS RELATIVE PERCENT: 90.7 %
PDW BLD-RTO: 14.7 % (ref 12.4–15.4)
PERFORMED ON: ABNORMAL
PHOSPHORUS: 3.7 MG/DL (ref 2.5–4.9)
PLATELET # BLD: 183 K/UL (ref 135–450)
PMV BLD AUTO: 8 FL (ref 5–10.5)
POTASSIUM SERPL-SCNC: 4.5 MMOL/L (ref 3.5–5.1)
RBC # BLD: 3.18 M/UL (ref 4–5.2)
SODIUM BLD-SCNC: 131 MMOL/L (ref 136–145)
WBC # BLD: 14.9 K/UL (ref 4–11)

## 2021-08-21 PROCEDURE — 6360000002 HC RX W HCPCS: Performed by: INTERNAL MEDICINE

## 2021-08-21 PROCEDURE — 2060000000 HC ICU INTERMEDIATE R&B

## 2021-08-21 PROCEDURE — 6370000000 HC RX 637 (ALT 250 FOR IP): Performed by: INTERNAL MEDICINE

## 2021-08-21 PROCEDURE — 2580000003 HC RX 258: Performed by: HOSPITALIST

## 2021-08-21 PROCEDURE — 99223 1ST HOSP IP/OBS HIGH 75: CPT | Performed by: HOSPITALIST

## 2021-08-21 PROCEDURE — 97116 GAIT TRAINING THERAPY: CPT

## 2021-08-21 PROCEDURE — 2580000003 HC RX 258: Performed by: INTERNAL MEDICINE

## 2021-08-21 PROCEDURE — 97161 PT EVAL LOW COMPLEX 20 MIN: CPT

## 2021-08-21 PROCEDURE — 85025 COMPLETE CBC W/AUTO DIFF WBC: CPT

## 2021-08-21 PROCEDURE — 36415 COLL VENOUS BLD VENIPUNCTURE: CPT

## 2021-08-21 PROCEDURE — 80069 RENAL FUNCTION PANEL: CPT

## 2021-08-21 PROCEDURE — 2500000003 HC RX 250 WO HCPCS: Performed by: HOSPITALIST

## 2021-08-21 RX ORDER — METHOCARBAMOL 750 MG/1
750 TABLET, FILM COATED ORAL EVERY 6 HOURS PRN
Status: DISCONTINUED | OUTPATIENT
Start: 2021-08-21 | End: 2021-08-23 | Stop reason: HOSPADM

## 2021-08-21 RX ORDER — PROCHLORPERAZINE EDISYLATE 5 MG/ML
10 INJECTION INTRAMUSCULAR; INTRAVENOUS ONCE
Status: COMPLETED | OUTPATIENT
Start: 2021-08-21 | End: 2021-08-21

## 2021-08-21 RX ADMIN — SODIUM CHLORIDE: 9 INJECTION, SOLUTION INTRAVENOUS at 06:19

## 2021-08-21 RX ADMIN — ONDANSETRON 4 MG: 2 INJECTION INTRAMUSCULAR; INTRAVENOUS at 18:54

## 2021-08-21 RX ADMIN — ACETAMINOPHEN 650 MG: 325 TABLET ORAL at 01:30

## 2021-08-21 RX ADMIN — METHOCARBAMOL 750 MG: 750 TABLET ORAL at 11:04

## 2021-08-21 RX ADMIN — SODIUM BICARBONATE: 84 INJECTION, SOLUTION INTRAVENOUS at 13:12

## 2021-08-21 RX ADMIN — ONDANSETRON 4 MG: 2 INJECTION INTRAMUSCULAR; INTRAVENOUS at 09:40

## 2021-08-21 RX ADMIN — ENOXAPARIN SODIUM 40 MG: 40 INJECTION SUBCUTANEOUS at 17:20

## 2021-08-21 RX ADMIN — MEROPENEM 1000 MG: 1 INJECTION, POWDER, FOR SOLUTION INTRAVENOUS at 17:23

## 2021-08-21 RX ADMIN — Medication 10 ML: at 20:20

## 2021-08-21 RX ADMIN — ONDANSETRON 4 MG: 2 INJECTION INTRAMUSCULAR; INTRAVENOUS at 01:26

## 2021-08-21 RX ADMIN — PROCHLORPERAZINE EDISYLATE 10 MG: 5 INJECTION INTRAMUSCULAR; INTRAVENOUS at 21:59

## 2021-08-21 RX ADMIN — MEROPENEM 1000 MG: 1 INJECTION, POWDER, FOR SOLUTION INTRAVENOUS at 11:02

## 2021-08-21 ASSESSMENT — PAIN SCALES - GENERAL
PAINLEVEL_OUTOF10: 0

## 2021-08-21 NOTE — PROGRESS NOTES
Pt febrile overnight. Tylenol PRN given per order. Pt also complained of nausea and had two episodes of vomiting during RN shift: relieved by PRN zofran. Will continue to monitor and assess pt needs.

## 2021-08-21 NOTE — PLAN OF CARE
Problem: Falls - Risk of:  Goal: Will remain free from falls  Description: Will remain free from falls  Outcome: Met This Shift     Problem: Falls - Risk of:  Goal: Absence of physical injury  Description: Absence of physical injury  Outcome: Met This Shift     Problem: Nutritional:  Goal: Nutritional status will improve  Description: Nutritional status will improve  Outcome: Met This Shift     Problem: Physical Regulation:  Goal: Diagnostic test results will improve  Description: Diagnostic test results will improve  Outcome: Met This Shift     Problem: Physical Regulation:  Goal: Will remain free from infection  Description: Will remain free from infection  Outcome: Met This Shift  Note: Temps this shift WNL's still c/o nausea , emesis, chills \"hot\" cold\" flashes     Problem: Physical Regulation:  Goal: Ability to maintain vital signs within normal range will improve  Description: Ability to maintain vital signs within normal range will improve  Outcome: Met This Shift     Problem: Physical Regulation:  Goal: Ability to maintain a body temperature in the normal range will improve  Description: Ability to maintain a body temperature in the normal range will improve  Outcome: Met This Shift     Problem: Respiratory:  Goal: Ability to maintain normal respiratory secretions will improve  Description: Ability to maintain normal respiratory secretions will improve  Outcome: Met This Shift     Problem: Skin Integrity:  Goal: Demonstration of wound healing without infection will improve  Description: Demonstration of wound healing without infection will improve  Outcome: Met This Shift     Problem: Skin Integrity:  Goal: Complications related to intravenous access or infusion will be avoided or minimized  Description: Complications related to intravenous access or infusion will be avoided or minimized  Outcome: Met This Shift     Problem: Fluid Volume:  Goal: Maintenance of adequate hydration will improve  Description: Maintenance of adequate hydration will improve  Outcome: Met This Shift  Note: On MIVF see I/O , pt missed saving urine x 2 , not taking po very well less nausea

## 2021-08-21 NOTE — PROGRESS NOTES
Physical Therapy    Facility/Department: [facility] [address] [facility]  Initial Assessment/ Treatment    NAME: Eloise Cade  : 1955  MRN: [id number]    Date of Service: 2021    Discharge Recommendations:  Eloise Cade scored a 18/24 on the AM-PAC short mobility form. Current research shows that an AM-PAC score of 18 or greater is typically associated with a discharge to the patient's home setting. Based on the patient's AM-PAC score and their current functional mobility deficits, it is recommended that the patient have 2-3 sessions per week of Physical Therapy at d/c to increase the patient's independence. At this time, this patient demonstrates the endurance and safety to discharge home with (home vs OP services) and a follow up treatment frequency of 2-3x/wk. Please see assessment section for further patient specific details. If patient discharges prior to next session this note will serve as a discharge summary. Please see below for the latest assessment towards goals. PT Equipment Recommendations  Equipment Needed: No (has RW at home)    Assessment   Body structures, Functions, Activity limitations: Decreased functional mobility ; Decreased endurance  Assessment: Pt is a 76 y.o female who presents to [facility] with fever, chills, and SOB. Pt performing functional mobility with SBA/CGA and use of RW. Pt demos decreased endurance d/t fatigue. Pt lives at home with her daughter and grand daughter and is able to recieve intermittent supervision from family. Pt typically uses RW and has one at home. Pt would continue to benefit from skilled PT to improve independence with functional mobility, LE strength, and functional endurance. Will follow.   Treatment Diagnosis: decreased functional mobility and endurance  Prognosis: Good  Decision Making: Low Complexity  PT Education: Goals;PT Role;Plan of Care;Transfer Training;Functional Mobility Training;Gait Training  Patient Education: pt verbalizes understanding  REQUIRES PT FOLLOW UP: Yes  Activity Tolerance  Activity Tolerance: Patient Tolerated treatment well;Patient limited by fatigue;Patient limited by endurance       Patient Diagnosis(es): The primary encounter diagnosis was Fever, unspecified fever cause. A diagnosis of Leukocytosis, unspecified type was also pertinent to this visit. has a past medical history of HTN (hypertension), Hyperlipidemia, Osteoarthritis, and Type II or unspecified type diabetes mellitus without mention of complication, not stated as uncontrolled. has a past surgical history that includes joint replacement (4/03,05,07); Lumbar disc surgery; lumbar laminectomy; and Cholecystectomy. Restrictions  Position Activity Restriction  Other position/activity restrictions: Up as tolerated  Vision/Hearing  Vision: Impaired  Vision Exceptions: Wears glasses for reading  Hearing: Within functional limits     Subjective  General  Chart Reviewed:  Yes  Additional Pertinent Hx: 76 y.o. female with medical history of HTN, DM type 2, OA, who presents to [facility] with fevers and chills  Response To Previous Treatment: Not applicable  Family / Caregiver Present: No  Referring Practitioner: Shaniqua Garcia MD  Referral Date : 08/20/21  Diagnosis: Sepsis  Follows Commands: Within Functional Limits  Subjective  Subjective: Pt supine in bed and agreeable to PT evaluation  Pain Screening  Patient Currently in Pain: Denies  Vital Signs  Patient Currently in Pain: Denies     Orientation  Orientation  Overall Orientation Status: Within Normal Limits  Social/Functional History  Social/Functional History  Lives With: Daughter (and grand daughter)  Type of Home: House  Home Layout: One level  Home Access: Level entry  Bathroom Shower/Tub: Tub/Shower unit  Bathroom Toilet: Standard  Bathroom Equipment: Grab bars in shower, Toilet raiser, Grab bars around toilet  Bathroom Accessibility: Ra Ruvalcaba: Jermain polanco, [address], [employer] Training, Neuromuscular Re-education, Safety Education & Training  Safety Devices  Type of devices:  All fall risk precautions in place, Bed alarm in place, Call light within reach, Left in bed, Nurse notified  Restraints  Initially in place: No     AM-PAC Score  AM-PAC Inpatient Mobility Raw Score : 18 (08/21/21 0843)  AM-PAC Inpatient T-Scale Score : 43.63 (08/21/21 0843)  Mobility Inpatient CMS 0-100% Score: 46.58 (08/21/21 0843)  Mobility Inpatient CMS G-Code Modifier : CK (08/21/21 0843)    Goals  Short term goals  Time Frame for Short term goals: d/c  Short term goal 1: bed mobility with supervision  Short term goal 2: transfers with supervision and LRAD  Short term goal 3: ambulate 150' with LRAD and supervision  Patient Goals   Patient goals : return home     Therapy Time   Individual Concurrent Group Co-treatment   Time In 0757         Time Out 0824         Minutes 27         Timed Code Treatment Minutes: 12 Minutes (+15 minute eval)     Jenn Prater, PT, DPT

## 2021-08-21 NOTE — PLAN OF CARE
Problem: Falls - Risk of:  Goal: Will remain free from falls  Description: Will remain free from falls  8/20/2021 2146 by Roberto Jones RN  Outcome: Ongoing  Note: Pt at risk for falls r/t weakness  Pt placed in fall risk precautions, bed alarm In place, bed in lowest locked position, call light in reach. RN encouraged pt to stay in bed and use call light to express needs.

## 2021-08-21 NOTE — PROGRESS NOTES
Hospitalist Progress Note      PCP: Vicente Mcgarry    Date of Admission: 2021    Chief Complaint on Admission: rigors    Pt Seen/Examined and Chart Reviewed. Admitting dx : sepsis    SUBJECTIVE/OBJECTIVE:   The patient is a 76 y.o. female with medical history of HTN, DM type 2, OA, who presents to [facility] with fevers and chills/rigors. She met sepsis criteria in ER. Admitted for sepsis work up. Found to have UTI and E Coli bacteremia. Continues to be febrile. Also having rigors and associated nausea and dry heaving. Moving her bowels well,. Urinating. Having more cough today. Tmax 103. Allergies  Pcn [penicillins]    Medications      Scheduled Meds:   meropenem  1,000 mg Intravenous Q8H    insulin lispro  0-6 Units Subcutaneous TID WC    insulin lispro  0-3 Units Subcutaneous Nightly    sodium chloride flush  5-40 mL Intravenous 2 times per day    enoxaparin  40 mg Subcutaneous Daily       Infusions:   sodium chloride         PRN Meds:  methocarbamol, sodium chloride flush, sodium chloride, ondansetron **OR** ondansetron, polyethylene glycol, acetaminophen **OR** acetaminophen, traMADol **OR** traMADol    Vitals    TEMPERATURE:  Current - Temp: 98.7 °F (37.1 °C); Max - Temp  Av.9 °F (37.7 °C)  Min: 98.3 °F (36.8 °C)  Max: 103 °F (39.4 °C)  RESPIRATIONS RANGE: Resp  Av.3  Min: 16  Max: 22  PULSE RANGE: Pulse  Av.3  Min: 69  Max: 89  BLOOD PRESSURE RANGE:  Systolic (00KUJ), FVD:133 , Min:118 , YRQ:171   ; Diastolic (18BRC), MMS:07, Min:50, Max:71    PULSE OXIMETRY RANGE: SpO2  Av %  Min: 92 %  Max: 99 %  24HR INTAKE/OUTPUT:      Intake/Output Summary (Last 24 hours) at 2021 1016  Last data filed at 2021 0547  Gross per 24 hour   Intake 5766.93 ml   Output 0 ml   Net 5766.93 ml       Exam:      General appearance: moderately toxic distress, appears stated age and cooperative.   Lungs: Clear to ascultation, bilaterally without Rales/Wheezes/Rhonchi with good respiratory effort. Heart: Regular rate and rhythm with Normal S1/S2 without  murmurs, rubs or gallops, point of maximum impulse non-displaced  Abdomen: Soft, non-tender or non-distended without rigidity or guarding and positive bowel sounds all four quadrants. Extremities: No clubbing, cyanosis, or edema bilaterally. Skin: Skin color, texture, turgor normal.    Neurologic: Alert and oriented X 3,  grossly non-focal.  Mental status: Alert, oriented, thought content appropriate. Data    Recent Labs     08/19/21  1223 08/20/21  0425 08/21/21  0517   WBC 19.5* 25.4* 14.9*   HGB 11.5* 9.7* 9.1*   HCT 34.4* 29.3* 27.2*    219 183      Recent Labs     08/19/21  1223 08/20/21  0425 08/21/21  0517   * 133* 131*   K 4.7 4.6 4.5   CL 98* 99 99   CO2 17* 21 19*   PHOS  --  4.1 3.7   BUN 30* 35* 35*   CREATININE 1.4* 1.9* 1.8*     Recent Labs     08/19/21  1223   AST 26   ALT 13   BILIDIR <0.2   BILITOT 0.7   ALKPHOS 109     No results for input(s): INR in the last 72 hours. Recent Labs     08/19/21  1223   TROPONINI <0.01       Consults:     IP CONSULT TO HOSPITALIST  IP CONSULT TO PHARMACY  IP CONSULT TO INFECTIOUS DISEASES  IP CONSULT TO NEPHROLOGY    Active Hospital Problems    Diagnosis Date Noted    [facility] [A41.9] 08/19/2021         ASSESSMENT AND PLAN      E. Coli bacteremia with UTI:  Follow up on blood and urine cultures sensitivities  CXR, CT A/P- non acute. Rapid flu negative. COVID 19 negative. ID following  Since patient had ongoing fevers post ceftriaxone- will change to meropenem until we have sensitivities      SACHIN:  Creat stabilized at 1.8. discussed with nephrology. Will continue to hold nephrotoxins and change to balanced IVF.      Vaginal candidiasis:  Finished course of diflucan. CT A/P non acute.     HTN:  Hold ACEI until SACHIN resolves     Acute on chronic back pain:  Recent imaging from 8/13 was negative for infection or other acute pathology.  Cont with tramadol and robaxin as needed. Back pain is stable. DVT Prophylaxis: lovenox  Diet: ADULT DIET; Regular; 4 carb choices (60 gm/meal);  Low Fat/Low Chol/High Fiber/JAISON  Code Status: Full Code    PT/OT Eval Status:18/24    Heather Ahmadi MD

## 2021-08-21 NOTE — CONSULTS
Infusions:   sodium chloride 125 mL/hr at 08/21/21 0619    sodium chloride         Labs:  CBC:   Recent Labs     08/19/21  1223 08/20/21  0425 08/21/21  0517   WBC 19.5* 25.4* 14.9*   HGB 11.5* 9.7* 9.1*    219 183     Ca/Mg/Phos:   Recent Labs     08/19/21  1223 08/20/21  0425 08/21/21  0517   CALCIUM 9.0 8.3 8.2*   PHOS  --  4.1 3.7     UA:  Recent Labs     08/19/21 1932   COLORU Yellow   CLARITYU Clear   GLUCOSEU Negative   BILIRUBINUR Negative   KETUA Negative   SPECGRAV 1.010   BLOODU SMALL*   PHUR 6.0   PROTEINU 30*   UROBILINOGEN 0.2   NITRU POSITIVE*   LEUKOCYTESUR MODERATE*   LABMICR YES   URINETYPE Voided      Urine Microscopic:   Recent Labs     08/19/21 1932   BACTERIA 2+*   WBCUA *   RBCUA 0-2   EPIU 0-1     Urine Chemistry: No results for input(s): CLUR, LABCREA, PROTEINUR, NAUR in the last 72 hours. ROS:     All systems reviewed and negative except as in HPI    Objective:     Vitals: /71   Pulse 74   Temp 98.3 °F (36.8 °C) (Oral)   Resp 22   Ht 6' (1.829 m)   Wt (!) 322 lb 12.1 oz (146.4 kg)   SpO2 99%   BMI 43.77 kg/m²    Wt Readings from Last 3 Encounters:   08/19/21 (!) 322 lb 12.1 oz (146.4 kg)   08/13/21 (!) 315 lb (142.9 kg)   07/29/13 (!) 326 lb 11.2 oz (148.2 kg)      24HR INTAKE/OUTPUT:      Intake/Output Summary (Last 24 hours) at 8/21/2021 0931  Last data filed at 8/21/2021 0830  Gross per 24 hour   Intake 4146.93 ml   Output 0 ml   Net 4146.93 ml     Constitutional:  awake, mild distress  HEENT:  MMM, No icterus  Neck: no bruits, No JVD  Cardiovascular:  reg rhythm  Respiratory: CTA, no crackles  Abdomen:  +BS, soft, NT, ND  Ext: no lower extremity edema  Psychiatric: mood and affect appropriate  Skin: dry/intact  CNS: alert, no agitation    IMAGING:  CT ABDOMEN PELVIS W IV CONTRAST Additional Contrast? None   Final Result      1. No acute abdominal or pelvic and normality identified. 2. Diverticulosis without diverticulitis.    3. Coronary artery calcification. XR CHEST PORTABLE   Final Result   1. No acute disease. Assessment :     1. SACHIN   -Non-Oliguric  -Baseline creat: <1 Now 1.9  -UA: pyuria  -abdominal CT scan: No hydronephrosis  -Volume: Euvolemic to mild hypovol  -Electrolytes: Hyponatremia  -Acid-Base: NAGMA and AGMA   -SACHIN in the setting of UTI. Home NSAID use    Recent Labs     08/19/21  1223 08/20/21  0425 08/21/21  0517   BUN 30* 35* 35*   CREATININE 1.4* 1.9* 1.8*     Recent Labs     08/19/21  1223 08/20/21  0425 08/21/21  0517   * 133* 131*   K 4.7 4.6 4.5   CO2 17* 21 19*         2. HTN  -Blood pressure at goal     BP Readings from Last 1 Encounters:   08/21/21 126/71       3. DM    4. UTI    Plan:     - IVF for hemodynamic support: change NS to sod bicarb  -Antimicrobials per primary team   - culture  - Hold Lisinopril  - Hold metformin  - Monitor BMP    -Monitor I/O, UOP  -Maintain MAP>65  -Avoid nephrotoxin, if able. -Dose meds to current eGFR    Spoke to Adrián Fuentes MD     Thank you for allowing us to participate in care of [place] . We will continue to follow. Feel free to contact me with any questions.       Philly Blum MD  8/21/2021    [facility] [address]  Office : [phone]  Fax :[phone]

## 2021-08-22 LAB
ALBUMIN SERPL-MCNC: 2.9 G/DL (ref 3.4–5)
ANION GAP SERPL CALCULATED.3IONS-SCNC: 16 MMOL/L (ref 3–16)
BASOPHILS ABSOLUTE: 0 K/UL (ref 0–0.2)
BASOPHILS RELATIVE PERCENT: 0.5 %
BUN BLDV-MCNC: 27 MG/DL (ref 7–20)
CALCIUM SERPL-MCNC: 8.5 MG/DL (ref 8.3–10.6)
CHLORIDE BLD-SCNC: 98 MMOL/L (ref 99–110)
CO2: 18 MMOL/L (ref 21–32)
CREAT SERPL-MCNC: 1.4 MG/DL (ref 0.6–1.2)
CULTURE, BLOOD 2: ABNORMAL
CULTURE, BLOOD 2: ABNORMAL
EOSINOPHILS ABSOLUTE: 0.3 K/UL (ref 0–0.6)
EOSINOPHILS RELATIVE PERCENT: 2.6 %
FOLATE: 11.25 NG/ML (ref 4.78–24.2)
GFR AFRICAN AMERICAN: 45
GFR NON-AFRICAN AMERICAN: 38
GLUCOSE BLD-MCNC: 122 MG/DL (ref 70–99)
GLUCOSE BLD-MCNC: 124 MG/DL (ref 70–99)
GLUCOSE BLD-MCNC: 139 MG/DL (ref 70–99)
GLUCOSE BLD-MCNC: 143 MG/DL (ref 70–99)
GLUCOSE BLD-MCNC: 166 MG/DL (ref 70–99)
HCT VFR BLD CALC: 27.4 % (ref 36–48)
HEMOGLOBIN: 9.3 G/DL (ref 12–16)
IRON SATURATION: 10 % (ref 15–50)
IRON: 14 UG/DL (ref 37–145)
LYMPHOCYTES ABSOLUTE: 0.5 K/UL (ref 1–5.1)
LYMPHOCYTES RELATIVE PERCENT: 5.4 %
MCH RBC QN AUTO: 28.8 PG (ref 26–34)
MCHC RBC AUTO-ENTMCNC: 33.8 G/DL (ref 31–36)
MCV RBC AUTO: 85.3 FL (ref 80–100)
MONOCYTES ABSOLUTE: 0.9 K/UL (ref 0–1.3)
MONOCYTES RELATIVE PERCENT: 9.2 %
NEUTROPHILS ABSOLUTE: 8.3 K/UL (ref 1.7–7.7)
NEUTROPHILS RELATIVE PERCENT: 82.3 %
ORGANISM: ABNORMAL
PDW BLD-RTO: 14.4 % (ref 12.4–15.4)
PERFORMED ON: ABNORMAL
PHOSPHORUS: 2.9 MG/DL (ref 2.5–4.9)
PLATELET # BLD: 178 K/UL (ref 135–450)
PMV BLD AUTO: 8.1 FL (ref 5–10.5)
POTASSIUM SERPL-SCNC: 4 MMOL/L (ref 3.5–5.1)
RBC # BLD: 3.22 M/UL (ref 4–5.2)
SODIUM BLD-SCNC: 132 MMOL/L (ref 136–145)
TOTAL IRON BINDING CAPACITY: 134 UG/DL (ref 260–445)
URINE CULTURE, ROUTINE: ABNORMAL
VITAMIN B-12: 211 PG/ML (ref 211–911)
WBC # BLD: 10.1 K/UL (ref 4–11)

## 2021-08-22 PROCEDURE — 83540 ASSAY OF IRON: CPT

## 2021-08-22 PROCEDURE — 2580000003 HC RX 258: Performed by: HOSPITALIST

## 2021-08-22 PROCEDURE — 99233 SBSQ HOSP IP/OBS HIGH 50: CPT | Performed by: HOSPITALIST

## 2021-08-22 PROCEDURE — 97535 SELF CARE MNGMENT TRAINING: CPT

## 2021-08-22 PROCEDURE — 80069 RENAL FUNCTION PANEL: CPT

## 2021-08-22 PROCEDURE — 6370000000 HC RX 637 (ALT 250 FOR IP): Performed by: INTERNAL MEDICINE

## 2021-08-22 PROCEDURE — 82607 VITAMIN B-12: CPT

## 2021-08-22 PROCEDURE — 2500000003 HC RX 250 WO HCPCS: Performed by: HOSPITALIST

## 2021-08-22 PROCEDURE — 2060000000 HC ICU INTERMEDIATE R&B

## 2021-08-22 PROCEDURE — 82746 ASSAY OF FOLIC ACID SERUM: CPT

## 2021-08-22 PROCEDURE — 97165 OT EVAL LOW COMPLEX 30 MIN: CPT

## 2021-08-22 PROCEDURE — 85025 COMPLETE CBC W/AUTO DIFF WBC: CPT

## 2021-08-22 PROCEDURE — 2580000003 HC RX 258: Performed by: INTERNAL MEDICINE

## 2021-08-22 PROCEDURE — 36415 COLL VENOUS BLD VENIPUNCTURE: CPT

## 2021-08-22 PROCEDURE — 6360000002 HC RX W HCPCS: Performed by: INTERNAL MEDICINE

## 2021-08-22 PROCEDURE — 83550 IRON BINDING TEST: CPT

## 2021-08-22 RX ORDER — FERROUS SULFATE 325(65) MG
325 TABLET ORAL
Status: DISCONTINUED | OUTPATIENT
Start: 2021-08-23 | End: 2021-08-23 | Stop reason: HOSPADM

## 2021-08-22 RX ADMIN — METHOCARBAMOL 750 MG: 750 TABLET ORAL at 22:01

## 2021-08-22 RX ADMIN — Medication 5 ML: at 22:01

## 2021-08-22 RX ADMIN — SODIUM BICARBONATE: 84 INJECTION, SOLUTION INTRAVENOUS at 03:43

## 2021-08-22 RX ADMIN — ENOXAPARIN SODIUM 40 MG: 40 INJECTION SUBCUTANEOUS at 17:29

## 2021-08-22 RX ADMIN — TRAMADOL HYDROCHLORIDE 50 MG: 50 TABLET, FILM COATED ORAL at 22:01

## 2021-08-22 RX ADMIN — INSULIN LISPRO 1 UNITS: 100 INJECTION, SOLUTION INTRAVENOUS; SUBCUTANEOUS at 12:59

## 2021-08-22 RX ADMIN — MEROPENEM 1000 MG: 1 INJECTION, POWDER, FOR SOLUTION INTRAVENOUS at 02:26

## 2021-08-22 RX ADMIN — INSULIN LISPRO 1 UNITS: 100 INJECTION, SOLUTION INTRAVENOUS; SUBCUTANEOUS at 17:25

## 2021-08-22 RX ADMIN — SODIUM BICARBONATE: 84 INJECTION, SOLUTION INTRAVENOUS at 14:01

## 2021-08-22 RX ADMIN — METHOCARBAMOL 750 MG: 750 TABLET ORAL at 13:03

## 2021-08-22 RX ADMIN — CEFTRIAXONE SODIUM 2000 MG: 2 INJECTION, POWDER, FOR SOLUTION INTRAMUSCULAR; INTRAVENOUS at 09:07

## 2021-08-22 RX ADMIN — INSULIN LISPRO 1 UNITS: 100 INJECTION, SOLUTION INTRAVENOUS; SUBCUTANEOUS at 22:01

## 2021-08-22 ASSESSMENT — PAIN SCALES - GENERAL
PAINLEVEL_OUTOF10: 0
PAINLEVEL_OUTOF10: 4
PAINLEVEL_OUTOF10: 0

## 2021-08-22 NOTE — PLAN OF CARE
Problem: Falls - Risk of:  Goal: Will remain free from falls  Description: Will remain free from falls  Outcome: Met This Shift  Note: Side rails up x 2 call light in reach bed in low position bed alarm on refusing to be up in chair, walks to  frequentlyhx recent back fusion , sitting very uncomfortable     Problem: Falls - Risk of:  Goal: Absence of physical injury  Description: Absence of physical injury  Outcome: Met This Shift     Problem: Nutritional:  Goal: Nutritional status will improve  Description: Nutritional status will improve  Outcome: Met This Shift  Note: No n/v today feels much better     Problem: Physical Regulation:  Goal: Diagnostic test results will improve  Description: Diagnostic test results will improve  Outcome: Met This Shift  Note: 99 temp this am 1st temp low grade in over 24 hrs     Problem: Physical Regulation:  Goal: Will remain free from infection  Description: Will remain free from infection  Outcome: Met This Shift     Problem: Physical Regulation:  Goal: Ability to maintain vital signs within normal range will improve  Description: Ability to maintain vital signs within normal range will improve  Outcome: Met This Shift     Problem: Physical Regulation:  Goal: Ability to maintain a body temperature in the normal range will improve  Description: Ability to maintain a body temperature in the normal range will improve  Outcome: Met This Shift     Problem: Respiratory:  Goal: Ability to maintain normal respiratory secretions will improve  Description: Ability to maintain normal respiratory secretions will improve  Outcome: Met This Shift  Note: Sats WNL's on R/A     Problem: Skin Integrity:  Goal: Demonstration of wound healing without infection will improve  Description: Demonstration of wound healing without infection will improve  Outcome: Met This Shift  Note: 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 knee     Problem: Skin Integrity:  Goal: Complications related to intravenous access or infusion will be avoided or minimized  Description: Complications related to intravenous access or infusion will be avoided or minimized  Outcome: Met This Shift     Problem: Fluid Volume:  Goal: Maintenance of adequate hydration will improve  Description: Maintenance of adequate hydration will improve  Outcome: Met This Shift  Note: MIVf cont.  Solution changed see MAR

## 2021-08-22 NOTE — PROGRESS NOTES
Hospitalist Progress Note      PCP: Tawanda Omalley    Date of Admission: 2021    Chief Complaint on Admission: rigors    Pt Seen/Examined and Chart Reviewed. Admitting dx : sepsis    SUBJECTIVE/OBJECTIVE:   The patient is a 76 y.o. female with medical history of HTN, DM type 2, OA, who presents to [facility] with fevers and chills/rigors. She met sepsis criteria in ER. Admitted for sepsis work up. Found to have UTI and E Coli bacteremia. Feels much better today. Was afebrile overnight. No emesis. Tolerating some PO. Having soft/loose stools without abdominal pain. D/w nephrology- plan to continue with IVF today. Allergies  Pcn [penicillins]    Medications      Scheduled Meds:   cefTRIAXone (ROCEPHIN) IV  2,000 mg Intravenous Q24H    insulin lispro  0-6 Units Subcutaneous TID WC    insulin lispro  0-3 Units Subcutaneous Nightly    sodium chloride flush  5-40 mL Intravenous 2 times per day    enoxaparin  40 mg Subcutaneous Daily       Infusions:   sodium bicarbonate infusion 75 mL/hr at 21 0343    sodium chloride         PRN Meds:  methocarbamol, sodium chloride flush, sodium chloride, ondansetron **OR** ondansetron, polyethylene glycol, acetaminophen **OR** acetaminophen, traMADol **OR** traMADol    Vitals    TEMPERATURE:  Current - Temp: 98.4 °F (36.9 °C);  Max - Temp  Av.5 °F (36.9 °C)  Min: 98.1 °F (36.7 °C)  Max: 98.9 °F (37.2 °C)  RESPIRATIONS RANGE: Resp  Av  Min: 18  Max: 18  PULSE RANGE: Pulse  Av  Min: 56  Max: 66  BLOOD PRESSURE RANGE:  Systolic (16SCA), GTC:540 , Min:128 , WZW:210   ; Diastolic (14BEA), OYS:36, Min:68, Max:81    PULSE OXIMETRY RANGE: SpO2  Av.5 %  Min: 95 %  Max: 98 %  24HR INTAKE/OUTPUT:      Intake/Output Summary (Last 24 hours) at 2021 1222  Last data filed at 2021 1132  Gross per 24 hour   Intake 1617.78 ml   Output 2150 ml   Net -532.22 ml       Exam:      General appearance: no acute distress, appears stated age and cooperative. Lungs: Clear to ascultation, bilaterally without Rales/Wheezes/Rhonchi with good respiratory effort. Heart: Regular rate and rhythm with Normal S1/S2 without  murmurs, rubs or gallops, point of maximum impulse non-displaced  Abdomen: Soft, non-tender or non-distended without rigidity or guarding and positive bowel sounds all four quadrants. Extremities: No clubbing, cyanosis, or edema bilaterally. Skin: Skin color, texture, turgor normal.    Neurologic: Alert and oriented X 3,  grossly non-focal.  Mental status: Alert, oriented, thought content appropriate. Data    Recent Labs     08/20/21  0425 08/21/21  0517 08/22/21  0522   WBC 25.4* 14.9* 10.1   HGB 9.7* 9.1* 9.3*   HCT 29.3* 27.2* 27.4*    183 178      Recent Labs     08/20/21  0425 08/21/21  0517 08/22/21  0522   * 131* 132*   K 4.6 4.5 4.0   CL 99 99 98*   CO2 21 19* 18*   PHOS 4.1 3.7 2.9   BUN 35* 35* 27*   CREATININE 1.9* 1.8* 1.4*     Recent Labs     08/19/21  1223   AST 26   ALT 13   BILIDIR <0.2   BILITOT 0.7   ALKPHOS 109     No results for input(s): INR in the last 72 hours. Recent Labs     08/19/21  1223   TROPONINI <0.01       Consults:     IP CONSULT TO HOSPITALIST  IP CONSULT TO PHARMACY  IP CONSULT TO INFECTIOUS DISEASES  IP CONSULT TO NEPHROLOGY    Active Hospital Problems    Diagnosis Date Noted    [facility] [A41.9] 08/19/2021         ASSESSMENT AND PLAN      E. Coli bacteremia with UTI:  Follow up on blood and urine cultures sensitivities- pan sensitive E. Coli  CXR, CT A/P- non acute. Rapid flu negative. COVID 19 negative. Change meropenem to ceftriaxone per sensitivities  ID following       SACHIN:  Creatinine started to improve. 1.3 today. D/w nephrology- continue with bicarb drip today     Vaginal candidiasis:  Finished course of diflucan.  CT A/P non acute.     HTN:  Hold ACEI until SACHIN resolves     Acute on chronic back pain:  Recent imaging from 8/13 was negative for infection or other acute pathology. Cont with tramadol and robaxin as needed. Back pain is stable. Loose stools:  Monitor. Will check C. Diff if meets criteria. Encourage probiotics (yogurt) with meals    Anemia:  Multifactorial. No gross bleeding. IVF/sepsis contributing. Fe low- start replacement. GI follow up as outpatient once recovered from sepsis. DVT Prophylaxis: lovenox  Diet: ADULT DIET; Regular; 4 carb choices (60 gm/meal);  Low Fat/Low Chol/High Fiber/JAISON  Code Status: Full Code    PT/OT Eval Status:18/24    Cassie Pierce MD

## 2021-08-22 NOTE — PROGRESS NOTES
Occupational Therapy   Occupational Therapy Initial Assessment/Treatment  Date: 2021   Patient Name: Shawna Nur  MRN: [id number]     : 1955    Date of Service: 2021    Discharge Recommendations:  Shawna Nur scored a 19/24 on the AM-PAC ADL Inpatient form. Current research shows that an AM-PAC score of 18 or greater is typically associated with a discharge to the patient's home setting. Based on the patient's AM-PAC score, and their current ADL deficits, it is recommended that the patient have 2-3 sessions per week of Occupational Therapy at d/c to increase the patient's independence. At this time, this patient demonstrates the endurance and safety to discharge home with home services and a follow up treatment frequency of 2-3x/wk. Please see assessment section for further patient specific details. If patient discharges prior to next session this note will serve as a discharge summary. Please see below for the latest assessment towards goals. OT Equipment Recommendations  Equipment Needed: No  Other: defer to next level of care    Assessment   Performance deficits / Impairments: Decreased functional mobility ; Decreased ADL status; Decreased strength;Decreased balance;Decreased endurance    Assessment: Pt is a 76 y.o F who presents below baseline for ADLs and functional mobility/transfers. Pt reports living at home w/ daughter and grand daughter who are able to provide intermittent assist as they are gone during the day at work and school. Pt reports that her sister works from home and plans to stay with the pt at the pt's house during the day. Pt was previously able to completed ADLs on her own with use of AE prn (i.e. sock aid and reacher for dressing) and was independent with functional mobility (w/ use of cane or RW) and transfers. Pt currently performing functional mobility with SBA/CGA and use of RW.  Pt also demo toileting and toilet transfer w/ SBA and CGA respectively. Pt would benefit from cont OT services to maximize safety and increase functional independence. Treatment Diagnosis: impaired ADLs and functional mobility/transfers  Prognosis: Good  Decision Making: Low Complexity  OT Education: OT Role;Plan of Care;Transfer Training  Patient Education: pt verb understanding  REQUIRES OT FOLLOW UP: Yes  Activity Tolerance  Activity Tolerance: Patient Tolerated treatment well  Safety Devices  Safety Devices in place: Yes  Type of devices: Left in bed;Nurse notified;Call light within reach; Bed alarm in place           Patient Diagnosis(es): The primary encounter diagnosis was Fever, unspecified fever cause. A diagnosis of Leukocytosis, unspecified type was also pertinent to this visit. has a past medical history of HTN (hypertension), Hyperlipidemia, Osteoarthritis, and Type II or unspecified type diabetes mellitus without mention of complication, not stated as uncontrolled. has a past surgical history that includes joint replacement (4/03,05,07); Lumbar disc surgery; lumbar laminectomy; and Cholecystectomy. Treatment Diagnosis: impaired ADLs and functional mobility/transfers      Restrictions  Position Activity Restriction  Other position/activity restrictions: Up as tolerated    Subjective   General  Chart Reviewed: Yes  Patient assessed for rehabilitation services?: Yes  Additional Pertinent Hx: 76 y.o. female with medical history of HTN, DM type 2, OA, who presents to [facility] with fevers and chills  Family / Caregiver Present: No  Referring Practitioner: Dinora Vásquez MD  Diagnosis: Sepsis  Subjective  Subjective: Pt supine in bed upon arrival and agreeable to OT eval/treat.   Patient Currently in Pain: Denies  Pain Assessment  Pain Assessment: 0-10  Pain Level: 0  Vital Signs  Temp: 99 °F (37.2 °C)  Temp Source: Oral  Pulse: 64  Heart Rate Source: Monitor;Telemetry  Resp: 16  BP: (!) 147/80  BP Location: Left lower arm  MAP (mmHg): 102  Patient Position: Sitting  Level of Consciousness: Alert (0)  Patient Currently in Pain: Denies  Oxygen Therapy  SpO2: 97 %  O2 Device: None (Room air)     Social/Functional History  Social/Functional History  Lives With: Daughter (and grand daughter)  Type of Home: House  Home Layout: One level  Home Access: Level entry  Bathroom Shower/Tub: Tub/Shower unit  Bathroom Toilet: Standard  Bathroom Equipment: Grab bars in shower, Toilet raiser, Grab bars around toilet, Shower chair  Bathroom Accessibility: Accessible  Home Equipment: Rolling walker, Cane, Reacher, Sock aid, Grab bars  Receives Help From: Family  ADL Assistance: Independent  Homemaking Assistance: Needs assistance (family does laundry and cleaning)  Ambulation Assistance: Independent (use RW)  Transfer Assistance: Independent  Active : Yes  Mode of Transportation: Car  Occupation: Retired  Type of occupation: surgery scheduler  Leisure & Hobbies: reading, watching tv  Additional Comments: recent fall on last friday       Objective   Vision: Impaired  Vision Exceptions: Wears glasses for reading  Hearing: Within functional limits    Orientation  Overall Orientation Status: Within Normal Limits        Balance  Sitting Balance: Supervision  Standing Balance: Contact guard assistance  Standing Balance  Time: ~2min total  Activity: funcitonal mobility to/from bathroom; stance for grooming  Functional Mobility  Functional - Mobility Device: Rolling Walker  Activity: To/from bathroom  Assist Level: Contact guard assistance (-SBA)  Toilet Transfers  Toilet - Technique: Ambulating (w/ RW)  Equipment Used: Standard toilet  Toilet Transfer: Contact guard assistance  Toilet Transfers Comments: Heavy use of GB     ADL  Grooming: Supervision (Pt completed hand hygiene in stance at sink)  UE Dressing:  (pt simulated donning/doffing shirt while seated EOB w/ spvn)  LE Dressing:  (Pt attempted to doff socks while seated EOB.  Pt unable to reach feet, but reports she uses a sock aid and reacher at home to don pants and socks.  pt would likely require SBA for dynamic standing balance while completing pant mgmt at hips in stance)  Toileting: Contact guard assistance (CGA in stance for clothing mgmt)           Bed mobility  Supine to Sit: Stand by assistance (with bed rails and increased time to perform)  Sit to Supine: Minimal assistance (assist w/ RLE)  Scooting: Stand by assistance (to [facility])  Transfers  Sit to stand: Contact guard assistance (from EOB w/ bed raised)  Stand to sit: Contact guard assistance (to EOB w/ bed raised)        Cognition  Overall Cognitive Status: WNL  Perception  Overall Perceptual Status: WFL     Sensation  Overall Sensation Status: WFL        LUE AROM (degrees)  LUE AROM : WFL  Left Hand AROM (degrees)  Left Hand AROM: WFL  RUE AROM (degrees)  RUE AROM : WFL  Right Hand AROM (degrees)  Right Hand AROM: WFL  LUE Strength  Gross LUE Strength: WFL  RUE Strength  Gross RUE Strength: WFL        Hand Dominance  Hand Dominance: Right             Plan   Plan  Times per week: 2-5  Current Treatment Recommendations: Strengthening, Home Management Training, Patient/Caregiver Education & Training, Endurance Training, Safety Education & Training, Self-Care / ADL, Functional Mobility Training, Balance Training    G-Code     OutComes Score                                                  AM-PAC Score        AM-[facility] Inpatient Daily Activity Raw Score: 19 (08/22/21 1514)  AM-PAC Inpatient ADL T-Scale Score : 40.22 (08/22/21 1514)  ADL Inpatient CMS 0-100% Score: 42.8 (08/22/21 1514)  ADL Inpatient CMS G-Code Modifier : CK (08/22/21 1514)    Goals  Short term goals  Time Frame for Short term goals: by d/c  Short term goal 1: Pt will complete toilet transfer w/ Spvn  Short term goal 2: Pt will complete LE dressing w/ SBA and use of AE prn  Patient Goals   Patient goals : not stated       Therapy Time   Individual Concurrent Group Co-treatment   Time In 1406         Time Out 7770 Minutes 27                 Timed Code Treatment Minutes:  12    Total Treatment Minutes:  TONY Garcia 20, [place]

## 2021-08-22 NOTE — PROGRESS NOTES
-Non-Oliguric  -Baseline creat: <1 Now 1.9  -UA: pyuria  -abdominal CT scan: No hydronephrosis  -Volume: Euvolemic to mild hypovol  -Electrolytes: Hyponatremia  -Acid-Base: NAGMA and AGMA   -SACHIN in the setting of UTI. Home NSAID use    Recent Labs     08/19/21  1223 08/20/21  0425 08/21/21  0517 08/22/21  0522   BUN 30* 35* 35* 27*   CREATININE 1.4* 1.9* 1.8* 1.4*     Recent Labs     08/19/21  1223 08/20/21  0425 08/21/21  0517 08/22/21  0522   * 133* 131* 132*   K 4.7 4.6 4.5 4.0   CO2 17* 21 19* 18*         2. HTN  -Blood pressure at goal     BP Readings from Last 1 Encounters:   08/22/21 128/81       3. DM    4. UTI  -cult E coli    Plan:     - IVF for hemodynamic support:  sod bicarb x 1 L  -Antimicrobials per primary team   - Hold Lisinopril  - Hold metformin  - Monitor BMP    -Monitor I/O, UOP  -Maintain MAP>65  -Avoid nephrotoxin, if able. -Dose meds to current eGFR    Spoke to Caron Gee MD     Thank you for allowing us to participate in care of Griselda Burdick . We will continue to follow. Feel free to contact me with any questions.       Leonel Mayer MD  8/22/2021    [facility] [address] [place] [address]  Office : [phone]  Fax :[phone]

## 2021-08-23 VITALS
BODY MASS INDEX: 39.68 KG/M2 | HEART RATE: 67 BPM | WEIGHT: 293 LBS | HEIGHT: 72 IN | SYSTOLIC BLOOD PRESSURE: 155 MMHG | RESPIRATION RATE: 16 BRPM | DIASTOLIC BLOOD PRESSURE: 69 MMHG | OXYGEN SATURATION: 94 % | TEMPERATURE: 98.5 F

## 2021-08-23 LAB
ALBUMIN SERPL-MCNC: 2.8 G/DL (ref 3.4–5)
ANION GAP SERPL CALCULATED.3IONS-SCNC: 10 MMOL/L (ref 3–16)
BASOPHILS ABSOLUTE: 0 K/UL (ref 0–0.2)
BASOPHILS RELATIVE PERCENT: 0.3 %
BLOOD CULTURE, ROUTINE: NORMAL
BUN BLDV-MCNC: 20 MG/DL (ref 7–20)
CALCIUM SERPL-MCNC: 8 MG/DL (ref 8.3–10.6)
CHLORIDE BLD-SCNC: 96 MMOL/L (ref 99–110)
CO2: 24 MMOL/L (ref 21–32)
CREAT SERPL-MCNC: 1.1 MG/DL (ref 0.6–1.2)
EKG ATRIAL RATE: 93 BPM
EKG DIAGNOSIS: NORMAL
EKG P AXIS: 41 DEGREES
EKG P-R INTERVAL: 124 MS
EKG Q-T INTERVAL: 384 MS
EKG QRS DURATION: 124 MS
EKG QTC CALCULATION (BAZETT): 477 MS
EKG R AXIS: -64 DEGREES
EKG T AXIS: 57 DEGREES
EKG VENTRICULAR RATE: 93 BPM
EOSINOPHILS ABSOLUTE: 0.3 K/UL (ref 0–0.6)
EOSINOPHILS RELATIVE PERCENT: 2.7 %
GFR AFRICAN AMERICAN: 60
GFR NON-AFRICAN AMERICAN: 50
GLUCOSE BLD-MCNC: 139 MG/DL (ref 70–99)
HCT VFR BLD CALC: 26 % (ref 36–48)
HEMOGLOBIN: 8.9 G/DL (ref 12–16)
LYMPHOCYTES ABSOLUTE: 1 K/UL (ref 1–5.1)
LYMPHOCYTES RELATIVE PERCENT: 10.2 %
MCH RBC QN AUTO: 28.9 PG (ref 26–34)
MCHC RBC AUTO-ENTMCNC: 34.4 G/DL (ref 31–36)
MCV RBC AUTO: 84 FL (ref 80–100)
MONOCYTES ABSOLUTE: 1.3 K/UL (ref 0–1.3)
MONOCYTES RELATIVE PERCENT: 13.5 %
NEUTROPHILS ABSOLUTE: 6.9 K/UL (ref 1.7–7.7)
NEUTROPHILS RELATIVE PERCENT: 73.3 %
PDW BLD-RTO: 14.6 % (ref 12.4–15.4)
PHOSPHORUS: 2.5 MG/DL (ref 2.5–4.9)
PLATELET # BLD: 183 K/UL (ref 135–450)
PMV BLD AUTO: 8 FL (ref 5–10.5)
POTASSIUM SERPL-SCNC: 3.6 MMOL/L (ref 3.5–5.1)
RBC # BLD: 3.1 M/UL (ref 4–5.2)
SODIUM BLD-SCNC: 130 MMOL/L (ref 136–145)
WBC # BLD: 9.4 K/UL (ref 4–11)

## 2021-08-23 PROCEDURE — 99232 SBSQ HOSP IP/OBS MODERATE 35: CPT | Performed by: INTERNAL MEDICINE

## 2021-08-23 PROCEDURE — 80069 RENAL FUNCTION PANEL: CPT

## 2021-08-23 PROCEDURE — 6360000002 HC RX W HCPCS: Performed by: INTERNAL MEDICINE

## 2021-08-23 PROCEDURE — 99233 SBSQ HOSP IP/OBS HIGH 50: CPT | Performed by: INTERNAL MEDICINE

## 2021-08-23 PROCEDURE — 2580000003 HC RX 258: Performed by: INTERNAL MEDICINE

## 2021-08-23 PROCEDURE — 6370000000 HC RX 637 (ALT 250 FOR IP): Performed by: INTERNAL MEDICINE

## 2021-08-23 PROCEDURE — 85025 COMPLETE CBC W/AUTO DIFF WBC: CPT

## 2021-08-23 PROCEDURE — 36415 COLL VENOUS BLD VENIPUNCTURE: CPT

## 2021-08-23 RX ORDER — CIPROFLOXACIN 500 MG/1
500 TABLET, FILM COATED ORAL 2 TIMES DAILY
Qty: 20 TABLET | Refills: 0 | Status: SHIPPED | OUTPATIENT
Start: 2021-08-23 | End: 2021-09-02

## 2021-08-23 RX ORDER — FUROSEMIDE 40 MG/1
20 TABLET ORAL DAILY
Qty: 60 TABLET | Refills: 3 | Status: SHIPPED | OUTPATIENT
Start: 2021-08-30

## 2021-08-23 RX ADMIN — Medication 10 ML: at 08:12

## 2021-08-23 RX ADMIN — CEFTRIAXONE SODIUM 2000 MG: 2 INJECTION, POWDER, FOR SOLUTION INTRAMUSCULAR; INTRAVENOUS at 08:18

## 2021-08-23 RX ADMIN — FERROUS SULFATE TAB 325 MG (65 MG ELEMENTAL FE) 325 MG: 325 (65 FE) TAB at 08:12

## 2021-08-23 ASSESSMENT — PAIN SCALES - GENERAL
PAINLEVEL_OUTOF10: 0

## 2021-08-23 NOTE — PROGRESS NOTES
ID Follow-up NOTE    CC:   E coli bacteremia  Antibiotics: Ceftriaxone     Admit Date: 8/19/2021  Hospital Day: 5    Subjective:     Patient feels good, 'ready to go home'  No complaint     Objective:     Patient Vitals for the past 8 hrs:   BP Temp Temp src Pulse Resp   08/23/21 0821 (!) 155/69 98.5 °F (36.9 °C) Oral 67 16     I/O last 3 completed shifts: In: 2490.5 [P.O.:630; I.V.:1810.5; IV Piggyback:50]  Out: 1257 [Urine:1450]  I/O this shift:  In: 240 [P.O.:240]  Out: -     EXAM:  GENERAL: No apparent distress.   RA  HEENT: Membranes moist, no oral lesion  NECK:  Supple, no lymphadenopathy  LUNGS: Clear b/l, no rales, no dullness  CARDIAC: RRR, no murmur appreciated  ABD:  + BS, soft / NT  EXT:  No rash, no edema, no lesions  NEURO: No focal neurologic findings  PSYCH: Orientation, sensorium, mood normal  LINES:  Peripheral iv       Data Review:  Lab Results   Component Value Date    WBC 9.4 08/23/2021    HGB 8.9 (L) 08/23/2021    HCT 26.0 (L) 08/23/2021    MCV 84.0 08/23/2021     08/23/2021     Lab Results   Component Value Date    CREATININE 1.1 08/23/2021    BUN 20 08/23/2021     (L) 08/23/2021    K 3.6 08/23/2021    CL 96 (L) 08/23/2021    CO2 24 08/23/2021       Hepatic Function Panel:   Lab Results   Component Value Date    ALKPHOS 109 08/19/2021    ALT 13 08/19/2021    AST 26 08/19/2021    PROT 7.6 08/19/2021    PROT 7.7 03/07/2012    BILITOT 0.7 08/19/2021    BILIDIR <0.2 08/19/2021    IBILI see below 08/19/2021    LABALBU 2.8 08/23/2021       Micro:  8/19 SARS CoV-2 PCR neg  8/19 BC x 1  E coli   Antibiotic Interpretation SONAL   ampicillin Sensitive <=8 mcg/mL   ceFAZolin Sensitive <=2 mcg/mL   cefepime Sensitive <=2 mcg/mL   cefTRIAXone Sensitive <=1 mcg/mL   cefuroxime Sensitive <=4 mcg/mL   ciprofloxacin Sensitive <=1 mcg/mL   ertapenem Sensitive <=0.5 mcg/mL   gentamicin Sensitive <=4 mcg/mL   meropenem Sensitive <=1 mcg/mL   piperacillin-tazobactam Sensitive <=16 mcg/mL trimethoprim-sulfamethoxazole Sensitive <=2/38 mcg/mL      Urine cult >100k E coli     Imagin/19 Abd / Pelvic CT  1. No acute abdominal or pelvic and normality identified. 2. Diverticulosis without diverticulitis. 3. Coronary artery calcification.       CXR  'no acute disease'    Scheduled Meds:   cefTRIAXone (ROCEPHIN) IV  2,000 mg Intravenous Q24H    ferrous sulfate  325 mg Oral Daily with breakfast    insulin lispro  0-6 Units Subcutaneous TID WC    insulin lispro  0-3 Units Subcutaneous Nightly    sodium chloride flush  5-40 mL Intravenous 2 times per day    enoxaparin  40 mg Subcutaneous Daily       Continuous Infusions:   sodium chloride         PRN Meds:  methocarbamol, sodium chloride flush, sodium chloride, ondansetron **OR** ondansetron, polyethylene glycol, acetaminophen **OR** acetaminophen, traMADol **OR** traMADol      Assessment:     Hx DM, HTN, OA     Fever - resolved  Leukocytosis - resolved   E coli bacteremia   - probably urinary source     Plan:     Cont ceftriaxone      At discharge, levofloxacin 500 mg po daily x 7 additional days     Medical Decision Making:   The following items were considered in medical decision making:  Discussion of patient care with other providers  Reviewed clinical lab tests  Reviewed radiology tests  Reviewed other diagnostic tests/interventions  Independent review of radiologic images  Microbiology cultures and other micro tests reviewed       Discussed with pt  Yanci Lee MD

## 2021-08-23 NOTE — PLAN OF CARE
Problem: Nutritional:  Goal: Nutritional status will improve  Description: Nutritional status will improve  Outcome: Completed     Problem: Nutritional:  Goal: Nutritional status will improve  Description: Nutritional status will improve  Outcome: Completed     Problem: Physical Regulation:  Goal: Diagnostic test results will improve  Description: Diagnostic test results will improve  Outcome: Completed  Goal: Will remain free from infection  Description: Will remain free from infection  Outcome: Completed  Goal: Ability to maintain vital signs within normal range will improve  Description: Ability to maintain vital signs within normal range will improve  Outcome: Completed  Goal: Ability to maintain a body temperature in the normal range will improve  Description: Ability to maintain a body temperature in the normal range will improve  Outcome: Completed

## 2021-08-23 NOTE — DISCHARGE INSTR - COC
Continuity of Care Form    Patient Name: Brittney Castro   :  1955  MRN:  [id number]    Admit date:  2021  Discharge date:  ***    Code Status Order: Full Code   Advance Directives:      Admitting Physician:  Janice Arredondo MD  PCP: Jimi Ag    Discharging Nurse: [facility] Unit/Room#: 0462/9261-85  Discharging Unit Phone Number: ***    Emergency Contact:   Extended Emergency Contact Information  Primary Emergency Contact: Caden Cabralueiredo [address] [place] [address] Phone: [phone]  Mobile Phone: [phone]  Relation: Child    Past Surgical History:  Past Surgical History:   Procedure Laterality Date    CHOLECYSTECTOMY      JOINT REPLACEMENT  ,05,07    hips,knee    LUMBAR [address] SURGERY      LUMBAR LAMINECTOMY         Immunization History:   Immunization History   Administered Date(s) Administered    COVID-19, MAYI Adkins, 30mcg/0.3mL 2021, 2021       Active Problems:  Patient Active Problem List   Diagnosis Code    Venous insufficiency I87.2    Type II or unspecified type diabetes mellitus without mention of complication, not stated as uncontrolled E11.9    Hyperlipidemia E78.5    HTN (hypertension) I10    Hip dislocation, left ([address] S73.005A    Sepsis ([facility] A41.9       Isolation/Infection:   Isolation            No Isolation          Patient Infection Status       Infection Onset Added Last Indicated Last Indicated By Review Planned Expiration Resolved Resolved By    None active    Resolved    COVID-19 Rule Out 21 COVID-19 (Ordered)   21 Rule-Out Test Resulted            Nurse Assessment:  Last Vital Signs: BP (!) 155/69   Pulse 67   Temp 98.5 °F (36.9 °C) (Oral)   Resp 16   Ht 6' (1.829 m)   Wt (!) 322 lb 15.6 oz (146.5 kg)   SpO2 94%   BMI 43.80 kg/m²     Last documented pain score (0-10 scale): Pain Level: 0  Last Weight:   Wt Readings from Last 1 Encounters:   21 (!) 322 lb 15.6 oz (146.5 kg) Mental Status:  {IP PT MENTAL STATUS::::0}    IV Access:  { FELECIA IV ACCESS:572619516:::0}    Nursing Mobility/ADLs:  Walking   {CHP DME ADLs:352659188:::0}  Transfer  {CHP DME ADLs:258659001:::0}  Bathing  {CHP DME ADLs:767370895:::0}  Dressing  {CHP DME ADLs:794412540:::0}  Toileting  {CHP DME ADLs:661695985:::0}  Feeding  {CHP DME ADLs:448911435:::0}  Med Admin  {CHP DME ADLs:028960683:::0}  Med Delivery   { FELECIA MED Delivery:641878522:::0}    Wound Care Documentation and Therapy:        Elimination:  Continence: Bowel: {YES / RX:21817}  Bladder: {YES / XD:30361}  Urinary Catheter: {Urinary Catheter:027097940:::0}   Colostomy/Ileostomy/Ileal Conduit: {YES / WR:15238}       Date of Last BM: ***    Intake/Output Summary (Last 24 hours) at 2021 1008  Last data filed at 2021 0650  Gross per 24 hour   Intake 2347.49 ml   Output 900 ml   Net 1447.49 ml     I/O last 3 completed shifts: In: 2490.5 [P.O.:630; I.V.:1810.5;  IV Piggyback:50]  Out: 7038 [Urine:1450]    Safety Concerns:     [employer] Safety Concerns:832720713:::0}    Impairments/Disabilities:      [employer] Impairments/Disabilities:792076384:::0}    Nutrition Therapy:  Current Nutrition Therapy:   [employer] Diet List:018176235:::0}    Routes of Feeding: {CHP DME Other Feedings:674458617:::0}  Liquids: {Slp liquid thickness:58195}  Daily Fluid Restriction: {CHP DME Yes amt example:648815120:::0}  Last Modified Barium Swallow with Video (Video Swallowing Test): {Done Not Done LNVB:148661877:::3}    Treatments at the Time of Hospital Discharge:   Respiratory Treatments: ***  Oxygen Therapy:  {Therapy; copd oxygen:24263:::0}  Ventilator:    {[facility] Vent List:927976589:::0}    Rehab Therapies: {THERAPEUTIC INTERVENTION:1768356675}  Weight Bearing Status/Restrictions: [facility] Weight Bearin:::0}  Other Medical Equipment (for information only, NOT a DME order):  {EQUIPMENT:914038036}  Other Treatments: ***    Patient's personal belongings (please select all that are sent with patient):  {CHP DME Belongings:917495043:::0}    RN SIGNATURE:  {Esignature:417649916:::0}    CASE MANAGEMENT/SOCIAL WORK SECTION    Inpatient Status Date: ***    Readmission Risk Assessment Score:  Readmission Risk              Risk of Unplanned Readmission:  15           Discharging to Facility/ Agency   Name:   Address:  Phone:  Fax:    Dialysis Facility (if applicable)   Name:  Address:  Dialysis Schedule:  Phone:  Fax:    / signature: {Esignature:396160417:::0}    PHYSICIAN SECTION    Prognosis: Fair    Condition at Discharge: Stable    Rehab Potential (if transferring to Rehab): Fair    Recommended Labs or Other Treatments After Discharge: PT/OT/ nursing/ bmp in a week/resume lasix after a week/ follow up with PCP/ nephrology in a week    Physician Certification: I certify the above information and transfer of Rolando Muhammad  is necessary for the continuing treatment of the diagnosis listed and that she requires Home Care for less 30 days.      Update Admission H&P: No change in H&P    PHYSICIAN SIGNATURE:  Electronically signed by Caitlyn Garcia MD on 8/23/21 at 10:08 AM EDT

## 2021-08-23 NOTE — DISCHARGE SUMMARY
[facility]    Patient Demographics    Patient. Acie Base  Date of Birth. 1955  MRN. [id number]     Primary care provider. Ruslan Chow  (Tel: [phone])    Admit date: 8/19/2021    Discharge date (blank if same as Note Date): Note Date: 8/23/2021     Reason for Hospitalization. Chief Complaint   Patient presents with    Fever    Shortness of Breath    Chills         Significant Findings. Active Problems:    Sepsis ([address]  Resolved Problems:    * No resolved hospital problems. *  Sepsis secondary to E. coli bacteremia  Morbid obesity BMI of 43.8  SACHIN during the stay improved  With gentle candidiasis s/p Diflucan  Problems and results from this hospitalization that need follow up.  1. E. coli bacteremia  2. Acute kidney injury    Significant test results and incidental findings. 1.   CT ABDOMEN PELVIS W IV CONTRAST Additional Contrast? None   Final Result      1. No acute abdominal or pelvic and normality identified. 2. Diverticulosis without diverticulitis. 3. Coronary artery calcification. XR CHEST PORTABLE   Final Result   1. No acute disease. Invasive procedures and treatments. [address] Course. Patient with a medical history of hypertension type 2 diabetes mellitus osteoarthritis presented to the ER with fevers and chills was in sepsis. Patient was found to have E. coli bacteremia. Urine culture also did grow E. coli. Possible source can be urine. Urine culture also did grow E. Coli. Patient did improve. Patient will be discharged on Cipro to complete the course. Patient did had mild SACHIN. Did improve. Patient was on Lasix and lisinopril at home. As blood pressure slightly elevated on discharge I did resume lisinopril advised the patient to hold Lasix for a week. Recommend getting a BMP in a week and if renal functions are stable consider resuming oral Lasix.   Advised the patient follow with PCP. Consults. IP CONSULT TO HOSPITALIST  IP CONSULT TO PHARMACY  IP CONSULT TO INFECTIOUS DISEASES  IP CONSULT TO NEPHROLOGY  IP CONSULT TO SOCIAL WORK  IP CONSULT TO HOME CARE NEEDS    Physical examination on discharge day. BP (!) 155/69   Pulse 67   Temp 98.5 °F (36.9 °C) (Oral)   Resp 16   Ht 6' (1.829 m)   Wt (!) 322 lb 15.6 oz (146.5 kg)   SpO2 94%   BMI 43.80 kg/m²   General appearance. Alert. Looks comfortable. HEENT. Sclera clear. Moist mucus membranes. Cardiovascular. Regular rate and rhythm, normal S1, S2. No murmur. Respiratory. Not using accessory muscles. Clear to auscultation bilaterally, no wheeze. Gastrointestinal. Abdomen soft, non-tender, not distended, normal bowel sounds  Neurology. Facial symmetry. No speech deficits. Moving all extremities equally. Extremities. No edema in lower extremities. Skin. Warm, dry, normal turgor        Condition at time of discharge stable    Medication instructions provided to patient at discharge. Medication List      START taking these medications    ciprofloxacin 500 MG tablet  Commonly known as: Cipro  Take 1 tablet by mouth 2 times daily for 10 days        CHANGE how you take these medications    furosemide 40 MG tablet  Commonly known as: LASIX  Take 0.5 tablets by mouth daily  Start taking on: August 30, 2021  What changed: These instructions start on August 30, 2021. If you are unsure what to do until then, ask your doctor or other care provider.         CONTINUE taking these medications    acetaminophen 325 MG tablet  Commonly known as: Tylenol  Take 2 tablets by mouth every 6 hours as needed for Pain     aspirin 81 MG EC tablet     insulin glargine 100 UNIT/ML injection vial  Commonly known as: LANTUS     lisinopril 20 MG tablet  Commonly known as: PRINIVIL;ZESTRIL     methocarbamol 500 MG tablet  Commonly known as: ROBAXIN     ONE TOUCH CLUB LANCETS Misc  One Touch Ultra 2  Test 2-3 times daily     ONE TOUCH ULTRA TEST strip  Generic drug: blood glucose test strips  USE AS DIRECTED TO TEST 2-3 TIMES PER DAY     traMADol 50 MG tablet  Commonly known as: ULTRAM     Tylenol PM Extra Strength  MG tablet  Generic drug: diphenhydrAMINE-APAP (sleep)           Where to Get Your Medications      These medications were sent to [facility], [address] [place] [address] [phone] Bernice Habermann [phone]  [address] [place] [address]    Phone: [phone]   · ciprofloxacin 500 MG tablet  · furosemide 40 MG tablet         Discharge recommendations given to patient. Follow Up. Primary care provider disposition. home  Activity. activity as tolerated  Diet: ADULT DIET; Regular; 4 carb choices (60 gm/meal); Low Fat/Low Chol/High Fiber/JAISON      Spent 35 minutes in discharge process.     Signed:  David Ellison MD     8/23/2021 10:10 AM

## 2021-08-23 NOTE — CARE COORDINATION
voucher for patients co-pays  5. Patients can then  the prescription on their way out of the hospital at discharge, or pharmacy can deliver to the bedside if staff is available. (payment due at time of pick-up or delivery - cash, check, or card accepted)     Able to afford home medications/ co-pay costs: Yes    ADLS:  Current PT AM-PAC Score: 18 /24  Current OT AM-PAC Score: 19 /24      DISCHARGE Disposition: Home with [address]: [facility]     LOC at discharge: Not Applicable  FELECIA Completed: Not Indicated    Notification completed in HENS/PAS?:  Not Applicable    IMM Completed:   Yes, Case management has presented and reviewed IMM letter #2 to the patient and/or family/ POA. Patient and/or family/POA verbalized understanding of their medicare rights and appeal process if needed. Patient and/or family/POA has signed, initialed and placed today's date (8/23/21) and time (1130) on IMM letter #2 on the the appropriate lines. Patient and/or family/POA, copy of letter offered and they are aware that this original copy of IMM letter #2 is available prior to discharge from the paper chart on the unit. Electronic documentation has been entered into epic for IMM letter #2 and original paper copy has been added to the paper chart at the nurses station. Transportation:  Transportation PLAN for discharge: family   Mode of Transport: [address] ordered at discharge: Yes  [address]: [facility]   Phone: [phone]  Fax: [phone]  Orders faxed: Yes    Durable Medical Equipment:  DME Provider: n/a  Equipment obtained during hospitalization: has own RW    Home Oxygen and Respiratory Equipment:  Oxygen needed at discharge?: Not [address]: Not Applicable  Portable tank available for discharge?: Not Indicated        Additional CM Notes:   Patient is from home with daughter, plans to return home with Steffany Douglass.   [facility] to provide home care, orders faxed. Patient's daughter to transport home. The Plan for Transition of Care is related to the following treatment goals of Sepsis ([facility] [address] [A41.9]  Fever, unspecified fever cause [R50.9]  Leukocytosis, unspecified type [D72.829]    The Patient and/or patient representative Adore and her family were provided with a choice of provider and agrees with the discharge plan Yes    Freedom of choice list was provided with basic dialogue that supports the patient's individualized plan of care/goals and shares the quality data associated with the providers.  Yes    Care Transitions patient: No    Trang Arias RN  [facility] [employer]  Case Management Department  Ph: [phone]  Fax: [phone]

## 2021-08-23 NOTE — CARE COORDINATION
CTN contacted intake at [address] [facility] [phone] per patient request, previous patient. They have accepted this patient and will pull referral from [facility]. They will contact patient and make arrangements for [facility].    Electronically signed by Geneva Harris LPN on 5/99/7783 at 18:04 AM

## 2021-08-23 NOTE — PROGRESS NOTES
Subcutaneous Daily     Continuous Infusions:   sodium chloride         Labs:  CBC:   Recent Labs     08/21/21  0517 08/22/21  0522 08/23/21  0443   WBC 14.9* 10.1 9.4   HGB 9.1* 9.3* 8.9*    178 183     Ca/Mg/Phos:   Recent Labs     08/21/21  0517 08/22/21  0522 08/23/21  0443   CALCIUM 8.2* 8.5 8.0*   PHOS 3.7 2.9 2.5     UA:  No results for input(s): Byron Sabino, GLUCOSEU, BILIRUBINUR, KETUA, SPECGRAV, BLOODU, PHUR, PROTEINU, UROBILINOGEN, NITRU, LEUKOCYTESUR, Gae Jerzy in the last 72 hours. Urine Microscopic:   No results for input(s): LABCAST, BACTERIA, COMU, HYALCAST, WBCUA, RBCUA, EPIU in the last 72 hours. Urine Chemistry: No results for input(s): Cori Abelson, PROTEINUR, NAUR in the last 72 hours. Objective:     Vitals: BP (!) 155/69   Pulse 67   Temp 98.5 °F (36.9 °C) (Oral)   Resp 16   Ht 6' (1.829 m)   Wt (!) 322 lb 15.6 oz (146.5 kg)   SpO2 94%   BMI 43.80 kg/m²    Wt Readings from Last 3 Encounters:   08/22/21 (!) 322 lb 15.6 oz (146.5 kg)   08/13/21 (!) 315 lb (142.9 kg)   07/29/13 (!) 326 lb 11.2 oz (148.2 kg)      24HR INTAKE/OUTPUT:      Intake/Output Summary (Last 24 hours) at 8/23/2021 1154  Last data filed at 8/23/2021 4561  Gross per 24 hour   Intake 2239.49 ml   Output 900 ml   Net 1339.49 ml     Constitutional:  awake, mild distress  HEENT:  MMM, No icterus  Neck: no bruits, No JVD  Cardiovascular:  reg rhythm  Respiratory: CTA, no crackles  Abdomen:  +BS, soft, NT, ND  Ext: no lower extremity edema    CNS: alert, no agitation    IMAGING:  CT ABDOMEN PELVIS W IV CONTRAST Additional Contrast? None   Final Result      1. No acute abdominal or pelvic and normality identified. 2. Diverticulosis without diverticulitis. 3. Coronary artery calcification. XR CHEST PORTABLE   Final Result   1. No acute disease. Assessment :     1.  SACHIN   -Non-Oliguric  -Baseline creat: <1 Now 1.9  -UA: pyuria  -abdominal CT scan: No hydronephrosis  -Volume: Euvolemic to mild hypovol  -Electrolytes: Hyponatremia  -Acid-Base: NAGMA and AGMA   -SACHIN in the setting of UTI. Home NSAID use    Recent Labs     08/21/21  0517 08/22/21  0522 08/23/21  0443   BUN 35* 27* 20   CREATININE 1.8* 1.4* 1.1     Recent Labs     08/21/21  0517 08/22/21  0522 08/23/21  0443   * 132* 130*   K 4.5 4.0 3.6   CO2 19* 18* 24         2. HTN  -Blood pressure at goal     BP Readings from Last 1 Encounters:   08/23/21 (!) 155/69       3. DM    4. UTI  -cult E coli    Plan:     - d/c IVF   - Cr better   -Antimicrobials per primary team   - Hold Lisinopril  - Hold metformin  - Monitor BMP    -Monitor I/O, UOP  -Maintain MAP>65  -Avoid nephrotoxin, if able. -Dose meds to current eGFR        Thank you for allowing us to participate in care of Thor Quiñones . We will continue to follow. Feel free to contact me with any questions.       Tayo Erazo MD  8/23/2021    [facility]  [address]  Office : [phone]  Fax :[phone]

## 2022-01-12 NOTE — ED NOTES
Patient feels urge to urinate but anable to void using [place].   ISC'd for 1200ml clear yellow urine     Media Adventism, RN  08/13/21 0958 No

## 2024-07-14 ENCOUNTER — APPOINTMENT (OUTPATIENT)
Dept: GENERAL RADIOLOGY | Age: 69
End: 2024-07-14
Payer: MEDICARE

## 2024-07-14 ENCOUNTER — HOSPITAL ENCOUNTER (EMERGENCY)
Age: 69
Discharge: HOME | End: 2024-07-15
Attending: EMERGENCY MEDICINE
Payer: MEDICARE

## 2024-07-14 DIAGNOSIS — R11.2 NAUSEA AND VOMITING, UNSPECIFIED VOMITING TYPE: ICD-10-CM

## 2024-07-14 DIAGNOSIS — E86.0 DEHYDRATION: Primary | ICD-10-CM

## 2024-07-14 LAB
ALBUMIN SERPL-MCNC: 3.9 G/DL (ref 3.4–5)
ALP SERPL-CCNC: 80 U/L (ref 40–129)
ALT SERPL-CCNC: 6 U/L (ref 10–40)
ANION GAP SERPL CALCULATED.3IONS-SCNC: 18 MMOL/L (ref 3–16)
AST SERPL-CCNC: 10 U/L (ref 15–37)
BASE EXCESS BLDV CALC-SCNC: -2 MMOL/L (ref -2–3)
BASOPHILS # BLD: 0 K/UL (ref 0–0.2)
BASOPHILS NFR BLD: 0.4 %
BILIRUB DIRECT SERPL-MCNC: <0.2 MG/DL (ref 0–0.3)
BILIRUB INDIRECT SERPL-MCNC: ABNORMAL MG/DL (ref 0–1)
BILIRUB SERPL-MCNC: 0.6 MG/DL (ref 0–1)
BUN SERPL-MCNC: 22 MG/DL (ref 7–20)
CALCIUM SERPL-MCNC: 9.1 MG/DL (ref 8.3–10.6)
CHLORIDE SERPL-SCNC: 94 MMOL/L (ref 99–110)
CO2 BLDV-SCNC: 24 MMOL/L
CO2 SERPL-SCNC: 19 MMOL/L (ref 21–32)
COHGB MFR BLDV: 1.5 % (ref 0–1.5)
CREAT SERPL-MCNC: 1.6 MG/DL (ref 0.6–1.2)
DEPRECATED RDW RBC AUTO: 14.7 % (ref 12.4–15.4)
DO-HGB MFR BLDV: 54.2 %
EOSINOPHIL # BLD: 0.1 K/UL (ref 0–0.6)
EOSINOPHIL NFR BLD: 0.5 %
FLUAV RNA RESP QL NAA+PROBE: NOT DETECTED
FLUBV RNA RESP QL NAA+PROBE: NOT DETECTED
GFR SERPLBLD CREATININE-BSD FMLA CKD-EPI: 35 ML/MIN/{1.73_M2}
GLUCOSE SERPL-MCNC: 236 MG/DL (ref 70–99)
HCO3 BLDV-SCNC: 22.7 MMOL/L (ref 24–28)
HCT VFR BLD AUTO: 37.6 % (ref 36–48)
HGB BLD-MCNC: 12.2 G/DL (ref 12–16)
LACTATE BLDV-SCNC: 3.7 MMOL/L (ref 0.4–2)
LIPASE SERPL-CCNC: 13 U/L (ref 13–60)
LYMPHOCYTES # BLD: 0.9 K/UL (ref 1–5.1)
LYMPHOCYTES NFR BLD: 8.3 %
MCH RBC QN AUTO: 28.5 PG (ref 26–34)
MCHC RBC AUTO-ENTMCNC: 32.5 G/DL (ref 31–36)
MCV RBC AUTO: 87.7 FL (ref 80–100)
METHGB MFR BLDV: <0 % (ref 0–1.5)
MONOCYTES # BLD: 0.6 K/UL (ref 0–1.3)
MONOCYTES NFR BLD: 5.4 %
NEUTROPHILS # BLD: 9.7 K/UL (ref 1.7–7.7)
NEUTROPHILS NFR BLD: 85.4 %
NT-PROBNP SERPL-MCNC: 549 PG/ML (ref 0–124)
PCO2 BLDV: 37.5 MMHG (ref 41–51)
PH BLDV: 7.39 [PH] (ref 7.35–7.45)
PLATELET # BLD AUTO: 338 K/UL (ref 135–450)
PMV BLD AUTO: 8.4 FL (ref 5–10.5)
PO2 BLDV: <30 MMHG (ref 25–40)
POTASSIUM SERPL-SCNC: 3.6 MMOL/L (ref 3.5–5.1)
PROT SERPL-MCNC: 8 G/DL (ref 6.4–8.2)
RBC # BLD AUTO: 4.28 M/UL (ref 4–5.2)
SAO2 % BLDV: 45 %
SARS-COV-2 RNA RESP QL NAA+PROBE: NOT DETECTED
SODIUM SERPL-SCNC: 131 MMOL/L (ref 136–145)
TROPONIN, HIGH SENSITIVITY: 22 NG/L (ref 0–14)
TROPONIN, HIGH SENSITIVITY: 24 NG/L (ref 0–14)
WBC # BLD AUTO: 11.3 K/UL (ref 4–11)

## 2024-07-14 PROCEDURE — 36415 COLL VENOUS BLD VENIPUNCTURE: CPT

## 2024-07-14 PROCEDURE — 82803 BLOOD GASES ANY COMBINATION: CPT

## 2024-07-14 PROCEDURE — 93005 ELECTROCARDIOGRAM TRACING: CPT | Performed by: EMERGENCY MEDICINE

## 2024-07-14 PROCEDURE — 84484 ASSAY OF TROPONIN QUANT: CPT

## 2024-07-14 PROCEDURE — 80048 BASIC METABOLIC PNL TOTAL CA: CPT

## 2024-07-14 PROCEDURE — 85025 COMPLETE CBC W/AUTO DIFF WBC: CPT

## 2024-07-14 PROCEDURE — 87636 SARSCOV2 & INF A&B AMP PRB: CPT

## 2024-07-14 PROCEDURE — 96374 THER/PROPH/DIAG INJ IV PUSH: CPT

## 2024-07-14 PROCEDURE — 6360000002 HC RX W HCPCS: Performed by: EMERGENCY MEDICINE

## 2024-07-14 PROCEDURE — 2580000003 HC RX 258: Performed by: EMERGENCY MEDICINE

## 2024-07-14 PROCEDURE — 83605 ASSAY OF LACTIC ACID: CPT

## 2024-07-14 PROCEDURE — 83690 ASSAY OF LIPASE: CPT

## 2024-07-14 PROCEDURE — 83880 ASSAY OF NATRIURETIC PEPTIDE: CPT

## 2024-07-14 PROCEDURE — 80076 HEPATIC FUNCTION PANEL: CPT

## 2024-07-14 PROCEDURE — 71045 X-RAY EXAM CHEST 1 VIEW: CPT

## 2024-07-14 PROCEDURE — 99285 EMERGENCY DEPT VISIT HI MDM: CPT

## 2024-07-14 RX ORDER — SEMAGLUTIDE 2.68 MG/ML
2 INJECTION, SOLUTION SUBCUTANEOUS
COMMUNITY
Start: 2022-09-07

## 2024-07-14 RX ORDER — LEVOTHYROXINE SODIUM 0.07 MG/1
75 TABLET ORAL EVERY MORNING
COMMUNITY
Start: 2022-07-13

## 2024-07-14 RX ORDER — 0.9 % SODIUM CHLORIDE 0.9 %
1000 INTRAVENOUS SOLUTION INTRAVENOUS ONCE
Status: COMPLETED | OUTPATIENT
Start: 2024-07-14 | End: 2024-07-15

## 2024-07-14 RX ORDER — ONDANSETRON 2 MG/ML
4 INJECTION INTRAMUSCULAR; INTRAVENOUS ONCE
Status: COMPLETED | OUTPATIENT
Start: 2024-07-14 | End: 2024-07-14

## 2024-07-14 RX ORDER — 0.9 % SODIUM CHLORIDE 0.9 %
1000 INTRAVENOUS SOLUTION INTRAVENOUS ONCE
Status: COMPLETED | OUTPATIENT
Start: 2024-07-14 | End: 2024-07-14

## 2024-07-14 RX ORDER — ERGOCALCIFEROL 1.25 MG/1
50000 CAPSULE ORAL WEEKLY
COMMUNITY
Start: 2023-08-08

## 2024-07-14 RX ADMIN — ONDANSETRON 4 MG: 2 INJECTION INTRAMUSCULAR; INTRAVENOUS at 21:36

## 2024-07-14 RX ADMIN — SODIUM CHLORIDE 1000 ML: 9 INJECTION, SOLUTION INTRAVENOUS at 21:41

## 2024-07-14 ASSESSMENT — LIFESTYLE VARIABLES
HOW OFTEN DO YOU HAVE A DRINK CONTAINING ALCOHOL: NEVER
HOW MANY STANDARD DRINKS CONTAINING ALCOHOL DO YOU HAVE ON A TYPICAL DAY: PATIENT DOES NOT DRINK

## 2024-07-14 ASSESSMENT — PAIN - FUNCTIONAL ASSESSMENT: PAIN_FUNCTIONAL_ASSESSMENT: NONE - DENIES PAIN

## 2024-07-15 VITALS
HEIGHT: 72 IN | OXYGEN SATURATION: 97 % | RESPIRATION RATE: 15 BRPM | HEART RATE: 70 BPM | DIASTOLIC BLOOD PRESSURE: 59 MMHG | WEIGHT: 293 LBS | TEMPERATURE: 98.7 F | BODY MASS INDEX: 39.68 KG/M2 | SYSTOLIC BLOOD PRESSURE: 133 MMHG

## 2024-07-15 LAB
BILIRUB UR QL STRIP.AUTO: NEGATIVE
CLARITY UR: CLEAR
COLOR UR: YELLOW
EKG ATRIAL RATE: 79 BPM
EKG DIAGNOSIS: NORMAL
EKG P AXIS: 20 DEGREES
EKG P-R INTERVAL: 142 MS
EKG Q-T INTERVAL: 414 MS
EKG QRS DURATION: 136 MS
EKG QTC CALCULATION (BAZETT): 474 MS
EKG R AXIS: -57 DEGREES
EKG T AXIS: 43 DEGREES
EKG VENTRICULAR RATE: 79 BPM
GLUCOSE UR STRIP.AUTO-MCNC: NEGATIVE MG/DL
HGB UR QL STRIP.AUTO: NEGATIVE
KETONES UR STRIP.AUTO-MCNC: NEGATIVE MG/DL
LACTATE BLDV-SCNC: 1.1 MMOL/L (ref 0.4–2)
LEUKOCYTE ESTERASE UR QL STRIP.AUTO: NEGATIVE
NITRITE UR QL STRIP.AUTO: NEGATIVE
PH UR STRIP.AUTO: 6 [PH] (ref 5–8)
PROT UR STRIP.AUTO-MCNC: NEGATIVE MG/DL
SP GR UR STRIP.AUTO: 1.01 (ref 1–1.03)
UA COMPLETE W REFLEX CULTURE PNL UR: NORMAL
UA DIPSTICK W REFLEX MICRO PNL UR: NORMAL
URN SPEC COLLECT METH UR: NORMAL
UROBILINOGEN UR STRIP-ACNC: 0.2 E.U./DL

## 2024-07-15 PROCEDURE — 81003 URINALYSIS AUTO W/O SCOPE: CPT

## 2024-07-15 PROCEDURE — 83605 ASSAY OF LACTIC ACID: CPT

## 2024-07-15 PROCEDURE — 2580000003 HC RX 258: Performed by: EMERGENCY MEDICINE

## 2024-07-15 PROCEDURE — 36415 COLL VENOUS BLD VENIPUNCTURE: CPT

## 2024-07-15 PROCEDURE — 96361 HYDRATE IV INFUSION ADD-ON: CPT

## 2024-07-15 RX ORDER — ONDANSETRON 4 MG/1
4 TABLET, ORALLY DISINTEGRATING ORAL 3 TIMES DAILY PRN
Qty: 21 TABLET | Refills: 0 | Status: SHIPPED | OUTPATIENT
Start: 2024-07-15

## 2024-07-15 RX ADMIN — SODIUM CHLORIDE 1000 ML: 9 INJECTION, SOLUTION INTRAVENOUS at 00:11

## 2024-07-15 NOTE — ED NOTES
Patient successfully walked with a walker and no other assistance. Reviewed discharge instructions, medication reconciliation, and patient education information with patient. Patient stated understanding, and answered questions to satisfaction. Patient verbalized satisfaction of care. PIV removed at this time. Patient wheeled safely to exitin no obvious acute distress. Patient verbalized understanding of returning to ER if symptoms worsen, and to follow up with primary health care provider.        Regi Friedman RN  07/15/24 5292

## 2024-07-15 NOTE — ED PROVIDER NOTES
[facility]  EMERGENCY DEPARTMENT ENCOUNTER          ATTENDING PHYSICIAN NOTE       Date of evaluation: 7/14/2024    Chief Complaint     Fatigue (PT presents to the ED with complaints of fatigue, weakness, and just generally not feeling well. Pt reports having difficulty eating and drinking since Saturday. EMS reported glucose to be 280. )      History of Present Illness     Reta Pardo is a 68 y.o. female who presents with a chief complaint of fatigue.  Patient states she has been throwing up for the last 2-1/2 days.  Cannot keep anything down.  She is now feeling fatigued and weak and having general malaise.  Had a temp of 99.5 yesterday, concerned she may be getting a fever, lives with her niece who also started feeling sick today.  Has not taken her insulin for the last few days because she has not eaten anything.  Denies any chest pain, abdominal pain, shortness of breath, urinary symptoms.  Denies headache.  Does have a history of a diabetic foot ulcer that is being followed by wound care.    ASSESSMENT / PLAN  (MEDICAL DECISION MAKING)     INITIAL VITALS: BP: (!) 126/104, Temp: 98.7 °F (37.1 °C), Pulse: 77, Respirations: 16, SpO2: 97 %      Reta Pardo is a 68 y.o. female who presents with a chief complaint of fatigue.  Initial exam reveals a female in no acute distress with normal vitals, afebrile.  Physical exam remarkable for normal exam including benign abdomen.    Since presentation sounds infectious in nature.  Low suspicion for acute intra-abdominal abnormality especially given benign abdomen    Given patient's age, broad workup initiated.  White count slightly elevated at 11.3 but without any significant left shift.  Labs with elevated anion gap of 18 and creatinine of 1.6 from baseline of 1.1.  LFTs and lipase normal.  Troponin slightly elevated at 22 and 24, no significant delta, EKG nonischemic.  COVID and flu testing negative.  Urinalysis negative for infection.  Initial

## 2024-07-15 NOTE — DISCHARGE INSTRUCTIONS
You likely have a stomach bug. Use nausea medication as needed, and return for worsening symptoms.